# Patient Record
Sex: FEMALE | Race: WHITE | NOT HISPANIC OR LATINO | Employment: FULL TIME | URBAN - METROPOLITAN AREA
[De-identification: names, ages, dates, MRNs, and addresses within clinical notes are randomized per-mention and may not be internally consistent; named-entity substitution may affect disease eponyms.]

---

## 2017-01-12 ENCOUNTER — GENERIC CONVERSION - ENCOUNTER (OUTPATIENT)
Dept: OTHER | Facility: OTHER | Age: 32
End: 2017-01-12

## 2017-01-30 ENCOUNTER — ALLSCRIPTS OFFICE VISIT (OUTPATIENT)
Dept: OTHER | Facility: OTHER | Age: 32
End: 2017-01-30

## 2017-01-30 ENCOUNTER — APPOINTMENT (OUTPATIENT)
Dept: LAB | Facility: CLINIC | Age: 32
End: 2017-01-30
Payer: COMMERCIAL

## 2017-01-30 DIAGNOSIS — R73.9 HYPERGLYCEMIA: ICD-10-CM

## 2017-01-30 DIAGNOSIS — G47.00 INSOMNIA: ICD-10-CM

## 2017-01-30 LAB
ANION GAP SERPL CALCULATED.3IONS-SCNC: 6 MMOL/L (ref 4–13)
BUN SERPL-MCNC: 14 MG/DL (ref 5–25)
CALCIUM SERPL-MCNC: 8.9 MG/DL (ref 8.3–10.1)
CHLORIDE SERPL-SCNC: 105 MMOL/L (ref 100–108)
CHOLEST SERPL-MCNC: 186 MG/DL (ref 50–200)
CO2 SERPL-SCNC: 28 MMOL/L (ref 21–32)
CREAT SERPL-MCNC: 0.69 MG/DL (ref 0.6–1.3)
GFR SERPL CREATININE-BSD FRML MDRD: >60 ML/MIN/1.73SQ M
GLUCOSE SERPL-MCNC: 91 MG/DL (ref 65–140)
HDLC SERPL-MCNC: 37 MG/DL (ref 40–60)
LDLC SERPL CALC-MCNC: 88 MG/DL (ref 0–100)
POTASSIUM SERPL-SCNC: 4.4 MMOL/L (ref 3.5–5.3)
SODIUM SERPL-SCNC: 139 MMOL/L (ref 136–145)
TRIGL SERPL-MCNC: 303 MG/DL

## 2017-01-30 PROCEDURE — 80048 BASIC METABOLIC PNL TOTAL CA: CPT

## 2017-01-30 PROCEDURE — 36415 COLL VENOUS BLD VENIPUNCTURE: CPT

## 2017-01-30 PROCEDURE — 80061 LIPID PANEL: CPT

## 2017-08-29 ENCOUNTER — GENERIC CONVERSION - ENCOUNTER (OUTPATIENT)
Dept: OTHER | Facility: OTHER | Age: 32
End: 2017-08-29

## 2017-08-29 DIAGNOSIS — E78.1 PURE HYPERGLYCERIDEMIA: ICD-10-CM

## 2018-01-11 NOTE — MISCELLANEOUS
Message   Recorded as Task   Date: 12/27/2016 01:33 PM, Created By: [address]   Task Name: Follow Up   Assigned To: Rafael Stringer   Regarding Patient: Sergio Billings, Status: In Progress   Comment:    PraveenaJessy - 27 Dec 2016 1:33 PM     TASK CREATED  Can you please call this patient and let her know that her testing from her annual showed  BV - I sent an rx for metrogel for the patient  Her pap/HPV and cultures were negative  Thanks! Concepcion Peters - 27 Dec 2016 1:49 PM     TASK IN PROGRESS   Concepcion Peters - 27 Dec 2016 1:49 PM     TASK EDITED  lm for pt tcb for results and rx   Concepcion Peters - 27 Dec 2016 2:19 PM     TASK EDITED  pt informed results and rx        Active Problems    1  Bacterial vaginosis (616 10,041 9) (N76 0,B96 89)   2  Encounter for gynecological examination without abnormal finding (V72 31) (Z01 419)   3  Encounter for surveillance of contraceptive pills (V25 41) (Z30 41)   4  Insomnia (780 52) (G47 00)    Current Meds   1  MetroNIDAZOLE 0 75 % Vaginal Gel; APPLY 1 APPLICATOR Daily for 5 days; Therapy: 92Oxx8935 to (Last Rx:23Dwu4356)  Requested for: 54Bxw0381 Ordered   2  Tri-Estarylla 0 18/0 215/0 25 MG-35 MCG Oral Tablet; TAKE 1 TABLET DAILY AS   DIRECTED; Last Rx:[id number] Ordered   3  Zolpidem Tartrate 5 MG Oral Tablet; TAKE 1 TABLET AT  BEDTIME AS NEEDED FOR   INSOMNIA; Therapy: 13Yit6889 to (Evaluate:95Jeu5628); Last Rx:50Tmn0099 Ordered    Allergies    1   No Known Drug Allergies    Signatures   Electronically signed by : James Chakraborty, ; Dec 27 2016  2:19PM EST                       (Author)

## 2018-01-11 NOTE — MISCELLANEOUS
Message   Recorded as Task   Date: 01/12/2017 07:50 AM, Created By: Anni Diaz   Task Name: Call Back   Assigned To: Rosalinda Arce   Regarding Patient: Mady Hernandez, Status: In Progress   Comment:    Jessy Downs - 12 Jan 2017 7:50 AM     TASK CREATED  Caller: Self; Other; [phone] (Home)  pt was prescribed med for bacterial inf, done taking, got a Greek, done with that med,  pt now bleeding & using 2 super tampons a day, pt concerned pls CALL [phone],  ct7 is Ann Winston - 12 Jan 2017 8:17 AM     TASK IN PROGRESS   Jennifer Joaquin - 12 Jan 2017 8:25 AM     TASK EDITED  Pt used metrogel 12/27  Got yeast and on Jan 2, used Monistat 3  Began spotting and bleeding 1/7  Pt did take some ocs late  Pt still spotting - on 2nd week of pill  I told her to keep taking pills and if bleeding persists next cycle , tcb        Active Problems    1  Bacterial vaginosis (616 10,041 9) (N76 0,B96 89)   2  Encounter for gynecological examination without abnormal finding (V72 31) (Z01 419)   3  Encounter for surveillance of contraceptive pills (V25 41) (Z30 41)   4  Insomnia (780 52) (G47 00)    Current Meds   1  MetroNIDAZOLE 0 75 % Vaginal Gel; APPLY 1 APPLICATOR Daily for 5 days; Therapy: 78Obm4984 to (Last Rx:71Uyx1420)  Requested for: 16Qhn0688 Ordered   2  Tri-Estarylla 0 18/0 215/0 25 MG-35 MCG Oral Tablet; TAKE 1 TABLET DAILY AS   DIRECTED; Last Rx:[id number] Ordered   3  Zolpidem Tartrate 5 MG Oral Tablet; TAKE 1 TABLET AT  BEDTIME AS NEEDED FOR   INSOMNIA; Therapy: 46Wih9512 to (Evaluate:89Lhy1217); Last Rx:[id number] Ordered    Allergies    1  No Known Drug Allergies    Signatures   Electronically signed by :  Khoa Moreno, ; Jan 12 2017  8:26AM EST                       (Author)

## 2018-01-13 VITALS
TEMPERATURE: 96.7 F | WEIGHT: 154.25 LBS | SYSTOLIC BLOOD PRESSURE: 100 MMHG | HEART RATE: 68 BPM | DIASTOLIC BLOOD PRESSURE: 62 MMHG | BODY MASS INDEX: 22.85 KG/M2 | HEIGHT: 69 IN | RESPIRATION RATE: 14 BRPM

## 2018-01-22 VITALS
DIASTOLIC BLOOD PRESSURE: 70 MMHG | HEART RATE: 60 BPM | TEMPERATURE: 95.5 F | SYSTOLIC BLOOD PRESSURE: 112 MMHG | RESPIRATION RATE: 16 BRPM | WEIGHT: 157.25 LBS | BODY MASS INDEX: 23.56 KG/M2

## 2018-02-19 ENCOUNTER — TELEPHONE (OUTPATIENT)
Dept: FAMILY MEDICINE CLINIC | Facility: CLINIC | Age: 33
End: 2018-02-19

## 2018-02-20 NOTE — TELEPHONE ENCOUNTER
Patient was advised to follow up in 6 month  Last seen in 08/2017  Need to schedule appointment for follow up  Medication Zolpidem 10 mg once daily for insomnia can be filled until her next appointment

## 2018-03-19 ENCOUNTER — TELEPHONE (OUTPATIENT)
Dept: FAMILY MEDICINE CLINIC | Facility: CLINIC | Age: 33
End: 2018-03-19

## 2018-03-19 NOTE — TELEPHONE ENCOUNTER
Voicemail:    Patient requesting refill     Zolpidem 10mg    Please advise  Thank you      Patient has pending appointment with you 4/2018

## 2018-03-20 DIAGNOSIS — G47.00 INSOMNIA, UNSPECIFIED TYPE: Primary | ICD-10-CM

## 2018-03-20 RX ORDER — ZOLPIDEM TARTRATE 10 MG/1
1 TABLET ORAL
COMMUNITY
Start: 2016-12-21 | End: 2018-03-20 | Stop reason: SDUPTHER

## 2018-03-20 RX ORDER — ZOLPIDEM TARTRATE 10 MG/1
10 TABLET ORAL
Qty: 30 TABLET | Refills: 0 | OUTPATIENT
Start: 2018-03-20 | End: 2018-04-16 | Stop reason: SDUPTHER

## 2018-03-20 NOTE — TELEPHONE ENCOUNTER
Patient is scheduled to see you on 4/30/18, are you able to authorize refill?  Checked PDMP, last refill 2/19/18 #30

## 2018-03-20 NOTE — TELEPHONE ENCOUNTER
I have open schedule next week, advise her to schedule appointment next week if possible as she was last seen on 08/2017  She needs reevaluation before prescribing further medications and discussion about other options for insomnia Last refill was on 2/19/2018, checked PDMP  Medication will only be refilled for 7-10 days until her appointment

## 2018-04-16 DIAGNOSIS — G47.00 INSOMNIA, UNSPECIFIED TYPE: ICD-10-CM

## 2018-04-16 RX ORDER — ZOLPIDEM TARTRATE 10 MG/1
10 TABLET ORAL
Qty: 15 TABLET | Refills: 0 | Status: SHIPPED | OUTPATIENT
Start: 2018-04-16 | End: 2018-04-30 | Stop reason: SDUPTHER

## 2018-04-16 NOTE — TELEPHONE ENCOUNTER
Voice message requesting refill of Zolpidem 10mg   Checked PDMP, last refill 3/20/18, Patient was last seen 1/17/18

## 2018-04-30 ENCOUNTER — OFFICE VISIT (OUTPATIENT)
Dept: FAMILY MEDICINE CLINIC | Facility: CLINIC | Age: 33
End: 2018-04-30
Payer: COMMERCIAL

## 2018-04-30 VITALS
RESPIRATION RATE: 16 BRPM | HEIGHT: 68 IN | WEIGHT: 146.4 LBS | SYSTOLIC BLOOD PRESSURE: 100 MMHG | BODY MASS INDEX: 22.19 KG/M2 | TEMPERATURE: 96.9 F | HEART RATE: 70 BPM | DIASTOLIC BLOOD PRESSURE: 60 MMHG

## 2018-04-30 DIAGNOSIS — J30.9 ALLERGIC RHINITIS, UNSPECIFIED SEASONALITY, UNSPECIFIED TRIGGER: ICD-10-CM

## 2018-04-30 DIAGNOSIS — G47.00 INSOMNIA, UNSPECIFIED TYPE: ICD-10-CM

## 2018-04-30 DIAGNOSIS — F51.01 PRIMARY INSOMNIA: ICD-10-CM

## 2018-04-30 DIAGNOSIS — E78.1 HYPERTRIGLYCERIDEMIA: Primary | ICD-10-CM

## 2018-04-30 PROBLEM — R21 SKIN RASH: Status: ACTIVE | Noted: 2017-08-29

## 2018-04-30 PROCEDURE — 99214 OFFICE O/P EST MOD 30 MIN: CPT | Performed by: FAMILY MEDICINE

## 2018-04-30 PROCEDURE — 1036F TOBACCO NON-USER: CPT | Performed by: FAMILY MEDICINE

## 2018-04-30 RX ORDER — FLUTICASONE PROPIONATE 50 MCG
1 SPRAY, SUSPENSION (ML) NASAL DAILY
Qty: 16 G | Refills: 0 | Status: SHIPPED | OUTPATIENT
Start: 2018-04-30 | End: 2019-07-22

## 2018-04-30 RX ORDER — NORGESTIMATE AND ETHINYL ESTRADIOL 7DAYSX3 28
1 KIT ORAL DAILY
COMMUNITY
End: 2019-07-22

## 2018-04-30 RX ORDER — ZOLPIDEM TARTRATE 10 MG/1
10 TABLET ORAL
Qty: 30 TABLET | Refills: 2 | Status: SHIPPED | OUTPATIENT
Start: 2018-04-30 | End: 2018-07-31 | Stop reason: SDUPTHER

## 2018-04-30 NOTE — ASSESSMENT & PLAN NOTE
Discussed with patient that long term zolpidem use is less than ideal, and that treating her underlying anxiety would help reduce need for zolpidem  Patient acknowledged this, and stated that she'd pay more attention to doing mindfulness exercising for her anxiety  Declined therapy or anxiolytics at this time  I recommended sleep medicine referral after her wedding in October, to assess alternatives to sleep aids

## 2018-04-30 NOTE — PROGRESS NOTES
Assessment/Plan     Allergic rhinitis  Start fluticasone spray, may use OTC H1 blockers  Hypertriglyceridemia  Recheck lipids, CMP now  Insomnia  Discussed with patient that long term zolpidem use is less than ideal, and that treating her underlying anxiety would help reduce need for zolpidem  Patient acknowledged this, and stated that she'd pay more attention to doing mindfulness exercising for her anxiety  Declined therapy or anxiolytics at this time  I recommended sleep medicine referral after her wedding in October, to assess alternatives to sleep aids  RTC 6 mo    Subjective     Chief Complaint: f/u insomnia    HPI:   HPI     Patient with chronic insomnia presents in followup  At last visit, was given dietary intervention ideas for hypertriglyceridemia  Has still been having insomnia, and has quite a bit more life stress lately (active job, planning a wedding, closed on a house)  Admits that her stress can affect her relationships at times  No other new complaints  The following portions of the patient's history were reviewed and updated as appropriate: allergies, current medications, past family history, past medical history, past social history, past surgical history and problem list     Review of Systems  Review of Systems   Constitutional: Negative for activity change, chills, fatigue and fever  HENT: Negative for congestion, sinus pain, sinus pressure and sore throat  Respiratory: Negative for cough, shortness of breath and wheezing  Cardiovascular: Negative for chest pain, palpitations and leg swelling  Gastrointestinal: Negative for abdominal pain, diarrhea, nausea and vomiting  Genitourinary: Negative for dysuria, frequency and urgency  Musculoskeletal: Negative for arthralgias, myalgias, neck pain and neck stiffness  Skin: Negative for rash  Neurological: Negative for light-headedness and headaches  Psychiatric/Behavioral: Positive for sleep disturbance   The patient is nervous/anxious  Objective   Vitals:    04/30/18 0942   BP: 100/60   Pulse: 70   Resp: 16   Temp: (!) 96 9 °F (36 1 °C)       Physical Exam   Constitutional: She is oriented to person, place, and time  She appears well-developed and well-nourished  No distress  HENT:   Head: Normocephalic and atraumatic  Eyes: Conjunctivae and EOM are normal  Pupils are equal, round, and reactive to light  Neck: Normal range of motion  Neck supple  Cardiovascular: Normal rate, regular rhythm and normal heart sounds  No murmur heard  Pulmonary/Chest: Effort normal and breath sounds normal  No respiratory distress  Abdominal: Soft  Bowel sounds are normal  There is no tenderness  Musculoskeletal: Normal range of motion  Neurological: She is alert and oriented to person, place, and time  Skin: Skin is warm and dry  Psychiatric: She has a normal mood and affect  Her behavior is normal  Judgment and thought content normal        Lab Results: I have reviewed all the lab results

## 2018-06-14 ENCOUNTER — HOSPITAL ENCOUNTER (OUTPATIENT)
Dept: ULTRASOUND IMAGING | Facility: HOSPITAL | Age: 33
Discharge: HOME | End: 2018-06-14
Payer: COMMERCIAL

## 2018-06-14 ENCOUNTER — OFFICE VISIT (OUTPATIENT)
Dept: FAMILY MEDICINE CLINIC | Facility: CLINIC | Age: 33
End: 2018-06-14
Payer: COMMERCIAL

## 2018-06-14 ENCOUNTER — TRANSCRIBE ORDERS (OUTPATIENT)
Dept: LAB | Facility: CLINIC | Age: 33
End: 2018-06-14

## 2018-06-14 ENCOUNTER — APPOINTMENT (OUTPATIENT)
Dept: LAB | Facility: CLINIC | Age: 33
End: 2018-06-14
Payer: COMMERCIAL

## 2018-06-14 VITALS
HEART RATE: 84 BPM | HEIGHT: 68 IN | RESPIRATION RATE: 16 BRPM | DIASTOLIC BLOOD PRESSURE: 80 MMHG | SYSTOLIC BLOOD PRESSURE: 124 MMHG | TEMPERATURE: 97.6 F | BODY MASS INDEX: 22.28 KG/M2 | WEIGHT: 147 LBS

## 2018-06-14 DIAGNOSIS — R10.13 MIDEPIGASTRIC PAIN: ICD-10-CM

## 2018-06-14 DIAGNOSIS — R10.13 MIDEPIGASTRIC PAIN: Primary | ICD-10-CM

## 2018-06-14 LAB
ALBUMIN SERPL BCP-MCNC: 3.5 G/DL (ref 3.5–5)
ALP SERPL-CCNC: 62 U/L (ref 46–116)
ALT SERPL W P-5'-P-CCNC: 24 U/L (ref 12–78)
ANION GAP SERPL CALCULATED.3IONS-SCNC: 6 MMOL/L (ref 4–13)
AST SERPL W P-5'-P-CCNC: 22 U/L (ref 5–45)
BILIRUB SERPL-MCNC: 0.3 MG/DL (ref 0.2–1)
BUN SERPL-MCNC: 14 MG/DL (ref 5–25)
CALCIUM SERPL-MCNC: 9 MG/DL (ref 8.3–10.1)
CHLORIDE SERPL-SCNC: 104 MMOL/L (ref 100–108)
CO2 SERPL-SCNC: 30 MMOL/L (ref 21–32)
CREAT SERPL-MCNC: 0.76 MG/DL (ref 0.6–1.3)
GFR SERPL CREATININE-BSD FRML MDRD: 103 ML/MIN/1.73SQ M
GLUCOSE SERPL-MCNC: 82 MG/DL (ref 65–140)
POTASSIUM SERPL-SCNC: 4.1 MMOL/L (ref 3.5–5.3)
PROT SERPL-MCNC: 6.6 G/DL (ref 6.4–8.2)
SODIUM SERPL-SCNC: 140 MMOL/L (ref 136–145)

## 2018-06-14 PROCEDURE — 36415 COLL VENOUS BLD VENIPUNCTURE: CPT

## 2018-06-14 PROCEDURE — 80053 COMPREHEN METABOLIC PANEL: CPT

## 2018-06-14 PROCEDURE — 3008F BODY MASS INDEX DOCD: CPT | Performed by: FAMILY MEDICINE

## 2018-06-14 PROCEDURE — 76705 ECHO EXAM OF ABDOMEN: CPT

## 2018-06-14 PROCEDURE — 99213 OFFICE O/P EST LOW 20 MIN: CPT | Performed by: FAMILY MEDICINE

## 2018-06-14 RX ORDER — PANTOPRAZOLE SODIUM 40 MG/1
40 TABLET, DELAYED RELEASE ORAL DAILY
Qty: 30 TABLET | Refills: 1 | Status: SHIPPED | OUTPATIENT
Start: 2018-06-14 | End: 2019-07-22

## 2018-06-14 NOTE — Clinical Note
Dr Allyn Fermin,   It looks like the technicians changed the order from a RUQ to an [address] abdomen limited when they performed it  Just wanted to update you!   Thanks,  [employer]

## 2018-06-14 NOTE — PROGRESS NOTES
Pierre Mejia 1985 female MRN: [id number]    Family Medicine Acute Visit    ASSESSMENT/PLAN  Problem List Items Addressed This Visit     Midepigastric pain - Primary     CMP, RUQ U/S - consider possible gallstones vs cholecystitis vs GERD/gastritis/PUD  Does not seem related to dietary intake, so PUD lower on differential  Will trial on high dose PPI as well - protonix 40mg daily  Urine HCG negative in office  F/U in office in next 2wk to review results and effect of PPI         Relevant Medications    pantoprazole (PROTONIX) 40 mg tablet    Other Relevant Orders    Comprehensive metabolic panel (Completed)              Future Appointments  Date Time Provider Alyx Lazaro   6/29/2018 2:00 PM José Suarez MD [facility]          SUBJECTIVE  CC: Abdominal Pain      HPI:  Pierre Mejia is a 35 y o  female who presents for abdominal pain    1 week history of mid epigastric abdominal pain, worse in past 3d - sharp and aching, feels better if she leans over, can't have bra strapped tight because it puts too much pressure on abdomen  Never had pain like this  Comes on suddenly and then stays rest of day  Has tried nexium for the past 3d, heating pad, meenakshi seltzer tablet - none of which has helped  In general she maintains a very clean diet and is a pescatarian   Traveling in [place] 1 week ago - but no new foods or changes   No n/v/d, +bloating, +"constipation" per Pt - last BM yesterday morning, normally 2 per day  No straining or hard stools  Tried dulcolax suppository, which brought about additional stools, but did not relieve pain    LMP due in 1 week, regular menstrual cycle, on birth control, takes it reliably         Review of Systems   Constitutional: Negative for chills, fatigue and fever  HENT: Negative for congestion, ear pain and sore throat  Respiratory: Negative for cough and shortness of breath  Cardiovascular: Negative for chest pain and palpitations  Gastrointestinal:        As per HPI   Genitourinary: Negative for dysuria, frequency, menstrual problem, pelvic pain, urgency, vaginal discharge and vaginal pain  Skin: Negative for rash  Historical Information   The patient history was reviewed as follows:    No past medical history on file  No past surgical history on file  Family History   Problem Relation Age of Onset    Hypertension Mother     Hypertension Father     Heart disease Maternal Grandfather         CARDIAC DISORDER    Coronary artery disease Family         NOT SPECIFIED WHICH GP      Social History   History   Alcohol use Not on file     History   Drug use: Unknown     History   Smoking Status    Never Smoker   Smokeless Tobacco    Never Used       Medications:     Current Outpatient Prescriptions:     norgestimate-ethinyl estradiol (TRI-ESTARYLLA) 0 18/0 215/0 25 MG-35 MCG per tablet, Take 1 tablet by mouth daily, Disp: , Rfl:     zolpidem (AMBIEN) 10 mg tablet, Take 1 tablet (10 mg total) by mouth daily at bedtime as needed for sleep, Disp: 30 tablet, Rfl: 2    fluticasone (FLONASE) 50 mcg/act nasal spray, 1 spray into each nostril daily, Disp: 16 g, Rfl: 0    pantoprazole (PROTONIX) 40 mg tablet, Take 1 tablet (40 mg total) by mouth daily, Disp: 30 tablet, Rfl: 1  No Known Allergies    OBJECTIVE    Vitals:   Vitals:    06/14/18 1400   BP: 124/80   Pulse: 84   Resp: 16   Temp: 97 6 °F (36 4 °C)   Weight: 66 7 kg (147 lb)   Height: 5' 8" (1 727 m)     Wt Readings from Last 3 Encounters:   06/14/18 66 7 kg (147 lb)   04/30/18 66 4 kg (146 lb 6 4 oz)   08/29/17 71 3 kg (157 lb 4 oz)     Body mass index is 22 35 kg/m²  Physical Exam   Constitutional: She is oriented to person, place, and time  She appears well-developed and well-nourished  No distress  HENT:   Head: Normocephalic and atraumatic  Eyes: Conjunctivae and EOM are normal  Right eye exhibits no discharge  Left eye exhibits no discharge  No scleral icterus  Neck: Normal range of motion  Neck supple  No tracheal deviation present  Cardiovascular: Normal rate, regular rhythm, normal heart sounds and intact distal pulses  Exam reveals no gallop and no friction rub  No murmur heard  Pulmonary/Chest: Effort normal and breath sounds normal  No respiratory distress  She has no wheezes  She has no rales  Abdominal: Soft  Bowel sounds are normal  She exhibits no distension  There is tenderness (+mid epigastric tenderness, +RUQ tenderness, but negative galicia sign, +LUQ tenderness)  There is no rebound, no guarding, no CVA tenderness and no tenderness at McBurney's point  Musculoskeletal: She exhibits no edema or deformity  Lymphadenopathy:     She has no cervical adenopathy  Neurological: She is alert and oriented to person, place, and time  She exhibits normal muscle tone  Skin: Skin is warm and dry  No rash noted  She is not diaphoretic  No erythema  Psychiatric: She has a normal mood and affect  Her behavior is normal  Judgment and thought content normal    Vitals reviewed  Lab:  I have personally reviewed all pertinent results  Imaging:  I have personally reviewed all pertinent results        Moses Sofia DO, PGY-3  [facility]   6/18/2018

## 2018-06-18 ENCOUNTER — TELEPHONE (OUTPATIENT)
Dept: FAMILY MEDICINE CLINIC | Facility: CLINIC | Age: 33
End: 2018-06-18

## 2018-06-18 PROBLEM — R10.13 MIDEPIGASTRIC PAIN: Status: ACTIVE | Noted: 2018-06-18

## 2018-06-18 NOTE — ASSESSMENT & PLAN NOTE
CMP, RUQ U/S - consider possible gallstones vs cholecystitis vs GERD/gastritis/PUD  Does not seem related to dietary intake, so PUD lower on differential  Will trial on high dose PPI as well - protonix 40mg daily  Urine HCG negative in office  F/U in office in next 2wk to review results and effect of PPI

## 2018-06-20 NOTE — TELEPHONE ENCOUNTER
Just tried to call her to discuss, left her a message to call back and let you know when is a good time for her to talk  I'd like to review it with her if possible  I'll try her again tomorrow while I'm in the office

## 2018-06-21 ENCOUNTER — TELEPHONE (OUTPATIENT)
Dept: FAMILY MEDICINE CLINIC | Facility: CLINIC | Age: 33
End: 2018-06-21

## 2018-06-22 NOTE — TELEPHONE ENCOUNTER
Tried calling twice yesterday, unable to get through  Was busy sign  Please review her ultrasound report with her and normal CMP  The liver mass I don't think is contributing to her abdominal pain, and it is an incidental finding that is likely benign  These are not uncommon  We should repeat the ultrasound in 6 months to make sure it hasn't grown in size    Please have her return to office at her prearranged follow up date next week to discuss her abdominal pain further

## 2018-06-22 NOTE — TELEPHONE ENCOUNTER
Patient returning Dr Floyd Diamond missed call  If doctor is not available patient would not mind speaking with a nurse

## 2018-07-31 DIAGNOSIS — G47.00 INSOMNIA, UNSPECIFIED TYPE: ICD-10-CM

## 2018-07-31 DIAGNOSIS — F51.01 PRIMARY INSOMNIA: ICD-10-CM

## 2018-07-31 RX ORDER — ZOLPIDEM TARTRATE 10 MG/1
10 TABLET ORAL
Qty: 30 TABLET | Refills: 0 | Status: SHIPPED | OUTPATIENT
Start: 2018-07-31 | End: 2018-09-05 | Stop reason: SDUPTHER

## 2018-09-04 ENCOUNTER — TELEPHONE (OUTPATIENT)
Dept: FAMILY MEDICINE CLINIC | Facility: CLINIC | Age: 33
End: 2018-09-04

## 2018-09-04 DIAGNOSIS — G47.00 INSOMNIA, UNSPECIFIED TYPE: ICD-10-CM

## 2018-09-04 DIAGNOSIS — F51.01 PRIMARY INSOMNIA: ICD-10-CM

## 2018-09-04 NOTE — TELEPHONE ENCOUNTER
RECEIEVED CALL FROM PTS BOYFRIEND STATING PT OUT OF MEDICATION WOULD LIKE REFILL CALLED IN AS SOON AS POSSIBLE

## 2018-09-05 RX ORDER — ZOLPIDEM TARTRATE 10 MG/1
10 TABLET ORAL
Qty: 30 TABLET | Refills: 0 | Status: SHIPPED | OUTPATIENT
Start: 2018-09-05 | End: 2018-10-02 | Stop reason: SDUPTHER

## 2018-09-05 NOTE — TELEPHONE ENCOUNTER
Refill request sent to Dr Romana Lawrence to [facility]  Also could could we please call the patient to schedule an appointment sometime at the end of the month   Thank you

## 2018-09-05 NOTE — TELEPHONE ENCOUNTER
Voicemail from pt requesting refill of Zolpidiem 10 mg  To go to [facility]  [employer]  Pt was a pt of Dr Ibrahima dempsey and last refill was 8/3/18 and we were the provider of the med

## 2018-10-01 DIAGNOSIS — G47.00 INSOMNIA, UNSPECIFIED TYPE: ICD-10-CM

## 2018-10-01 DIAGNOSIS — F51.01 PRIMARY INSOMNIA: ICD-10-CM

## 2018-10-01 NOTE — TELEPHONE ENCOUNTER
Pt calling for a refill of zolpidiem 10 mg tabs   PDMP states last refill was on 9/5/18 30 tabs  She is a past pt of Dr Georgiann Burkitt her last office visit was April 2018

## 2018-10-02 NOTE — TELEPHONE ENCOUNTER
Lorena Ashley,  Could you please E-prescribe Zolpidem for this patient? The patient called for a refill  Per PDMP, last Rx written 9/5/18 and filled for 30 days Disp # 30 tablets     Rx is for Zolpidem 10 mg once qhs, Disp # 30, 0 refills  Please let me or clinical staff know when the Rx has been sent so we may notify the patient  Thank you so much!

## 2018-10-03 RX ORDER — ZOLPIDEM TARTRATE 10 MG/1
10 TABLET ORAL
Qty: 30 TABLET | Refills: 0 | Status: SHIPPED | OUTPATIENT
Start: 2018-10-03 | End: 2018-10-23 | Stop reason: SDUPTHER

## 2018-10-22 DIAGNOSIS — F51.01 PRIMARY INSOMNIA: ICD-10-CM

## 2018-10-22 NOTE — TELEPHONE ENCOUNTER
Pt calling for refill of her Zolpidiem 10 mg is leaving on Friday for a trip for her wedding  Pt states today may be early but wont be back until after due date  Last refill in chart states 10/3  Thank you

## 2018-10-23 DIAGNOSIS — F51.01 PRIMARY INSOMNIA: ICD-10-CM

## 2018-10-23 RX ORDER — ZOLPIDEM TARTRATE 10 MG/1
10 TABLET ORAL
Qty: 30 TABLET | Refills: 0 | Status: SHIPPED | OUTPATIENT
Start: 2019-10-03 | End: 2018-10-23 | Stop reason: SDUPTHER

## 2018-10-23 RX ORDER — ZOLPIDEM TARTRATE 10 MG/1
10 TABLET ORAL
Qty: 30 TABLET | Refills: 0 | Status: SHIPPED | OUTPATIENT
Start: 2019-10-03 | End: 2018-11-28 | Stop reason: SDUPTHER

## 2018-11-27 DIAGNOSIS — F51.01 PRIMARY INSOMNIA: ICD-10-CM

## 2018-11-28 RX ORDER — ZOLPIDEM TARTRATE 10 MG/1
10 TABLET ORAL
Qty: 30 TABLET | Refills: 0 | Status: SHIPPED | OUTPATIENT
Start: 2019-10-03 | End: 2018-12-16 | Stop reason: SDUPTHER

## 2018-11-28 NOTE — TELEPHONE ENCOUNTER
Please call patient to schedule an appointment after this refill  Will not longer provide refills unless seen in the office   Thank you

## 2018-11-28 NOTE — TELEPHONE ENCOUNTER
Boogie Dorado! Could you please E-prescribe Zolpidem for this patient? The patient called for a refill  Rx is for Zolpidem 10 mg, Disp # 30, 0 refills  Per PDMP, last Rx written 10/28 & filled 10/28, Disp # 30    Please let me or clinical staff know when the Rx has been sent so we may notify the patient  Thank you so much!

## 2018-12-13 DIAGNOSIS — F51.01 PRIMARY INSOMNIA: ICD-10-CM

## 2018-12-17 RX ORDER — ZOLPIDEM TARTRATE 10 MG/1
10 TABLET ORAL
Qty: 30 TABLET | Refills: 0 | Status: SHIPPED | OUTPATIENT
Start: 2019-10-03 | End: 2019-01-23 | Stop reason: SDUPTHER

## 2018-12-26 NOTE — TELEPHONE ENCOUNTER
PT left voice mail to have zolpidem refilled, I called PT back to let her know it was refilled, she said she did not get a notification from the pharmacy and I explained it was refilled on 12/17/2018 PT stated she would call pharmacy and call back with any issues

## 2018-12-26 NOTE — TELEPHONE ENCOUNTER
Patient called and is requesting a medication refill for Zolpidem patient was told in November to set up an appointment to see her PCP  Patient has a pending appointment for 02/14 with Dr Weston that was made 12/13 patient is not willing to see Moe Art would like to stay with Dr Weston  Are we able to refill till than? Thank you in advance

## 2019-01-22 DIAGNOSIS — F51.01 PRIMARY INSOMNIA: ICD-10-CM

## 2019-01-23 RX ORDER — ZOLPIDEM TARTRATE 10 MG/1
10 TABLET ORAL
Qty: 30 TABLET | Refills: 0 | Status: SHIPPED | OUTPATIENT
Start: 2019-01-25 | End: 2019-02-18 | Stop reason: SDUPTHER

## 2019-01-23 NOTE — TELEPHONE ENCOUNTER
Lorena Ruby! Can you please E-prescribe Zolpidem 10 mg daily for this patient? The patient called for a refill  They have an appointment scheduled with Dr Jitendra Loomis on 2/14/19  Rx is for Zolpidem 10 mg qhs, Disp #30 , 0 refills  Per PDMP, last Rx written 11/25/18 & filled 12/28/18 , Disp # 30    Please let me or clinical staff know when the Rx has been sent so we may notify the patient  Thank you so much!

## 2019-02-14 ENCOUNTER — TELEPHONE (OUTPATIENT)
Dept: FAMILY MEDICINE CLINIC | Facility: CLINIC | Age: 34
End: 2019-02-14

## 2019-02-14 NOTE — TELEPHONE ENCOUNTER
Patient called to check on appt time, she missed her appt today for medication review  She said she didn't get reminder call  I put her in your [facility] for 2/18/19  We have been told by other patients that they haven't gotten reminders   Raulito Smith is looking into it

## 2019-02-18 ENCOUNTER — OFFICE VISIT (OUTPATIENT)
Dept: FAMILY MEDICINE CLINIC | Facility: CLINIC | Age: 34
End: 2019-02-18

## 2019-02-18 VITALS
WEIGHT: 152.6 LBS | RESPIRATION RATE: 16 BRPM | BODY MASS INDEX: 23.13 KG/M2 | SYSTOLIC BLOOD PRESSURE: 114 MMHG | HEIGHT: 68 IN | TEMPERATURE: 96.6 F | DIASTOLIC BLOOD PRESSURE: 70 MMHG | HEART RATE: 80 BPM

## 2019-02-18 DIAGNOSIS — E78.1 HYPERTRIGLYCERIDEMIA: ICD-10-CM

## 2019-02-18 DIAGNOSIS — Z31.69 PRE-CONCEPTION COUNSELING: ICD-10-CM

## 2019-02-18 DIAGNOSIS — F51.01 PRIMARY INSOMNIA: Primary | ICD-10-CM

## 2019-02-18 PROCEDURE — 99214 OFFICE O/P EST MOD 30 MIN: CPT | Performed by: FAMILY MEDICINE

## 2019-02-18 PROCEDURE — 3008F BODY MASS INDEX DOCD: CPT | Performed by: FAMILY MEDICINE

## 2019-02-18 RX ORDER — ZOLPIDEM TARTRATE 10 MG/1
10 TABLET ORAL
Qty: 30 TABLET | Refills: 2 | Status: SHIPPED | OUTPATIENT
Start: 2019-02-18 | End: 2019-03-15 | Stop reason: SDUPTHER

## 2019-02-18 NOTE — ASSESSMENT & PLAN NOTE
Refilled ambien  Referral given to sleep medicine to consider D/C as patient plans to conceive this year    Check CBC, TSH

## 2019-02-18 NOTE — PROGRESS NOTES
Family Medicine Follow-Up Office Visit  Dean Riedel 29 y o  female   MRN: [id number] : 1985  ENCOUNTER: 2019 11:04 AM    Assessment and Plan   Insomnia  Refilled Jobie Setting  Referral given to sleep medicine to consider D/C as patient plans to conceive this year  Check CBC, TSH  Hypertriglyceridemia  Check CMP, Lipids  RTC 6 months, counseling given regarding starting prenatal vitamins before trying to conceive  Chief Complaint     Chief Complaint   Patient presents with    Medication Refill       History of Present Illness   Dean Riedel is a 29y o -year-old female who presents today for followup of insomnia  Pt reports that she's doing well, using ambien but looking to taper off now that she and her  are considering conceiving  Pt also requests referral to fertility specialist to discuss best timing for conception  Review of Systems   Review of Systems   Constitutional: Negative for activity change, chills, fatigue and fever  HENT: Negative for congestion, sinus pressure, sinus pain and sore throat  Respiratory: Negative for cough, shortness of breath and wheezing  Cardiovascular: Negative for chest pain, palpitations and leg swelling  Gastrointestinal: Negative for abdominal pain, diarrhea, nausea and vomiting  Genitourinary: Negative for decreased urine volume, dysuria, frequency and urgency  Musculoskeletal: Negative for arthralgias, myalgias, neck pain and neck stiffness  Skin: Negative for rash  Neurological: Negative for dizziness, light-headedness, numbness and headaches  Active Problem List     Patient Active Problem List   Diagnosis    Hypertriglyceridemia    Insomnia    Skin rash    Allergic rhinitis    Midepigastric pain       Past Medical History, Past Surgical History, Family History, and Social History were reviewed and updated today as appropriate      Objective   /70   Pulse 80   Temp (!) 96 6 °F (35 9 °C)   Resp 16    5' 8" (1 727 m)   Wt 69 2 kg (152 lb 9 6 oz)   BMI 23 20 kg/m²     Physical Exam   Constitutional: She is oriented to person, place, and time  She appears well-developed and well-nourished  No distress  HENT:   Head: Normocephalic and atraumatic  Eyes: Pupils are equal, round, and reactive to light  Neck: Normal range of motion  Neck supple  Cardiovascular: Normal rate, regular rhythm and normal heart sounds  Exam reveals no gallop and no friction rub  No murmur heard  Pulmonary/Chest: Effort normal and breath sounds normal  No respiratory distress  She has no wheezes  She has no rales  Abdominal: Soft  She exhibits no distension  Musculoskeletal: Normal range of motion  Neurological: She is alert and oriented to person, place, and time  Psychiatric: She has a normal mood and affect   Her behavior is normal  Thought content normal      Diabetic Foot Exam    Pertinent Laboratory/Diagnostic Studies:  Lab Results   Component Value Date    BUN 14 06/14/2018    CREATININE 0 76 06/14/2018    CALCIUM 9 0 06/14/2018    K 4 1 06/14/2018    CO2 30 06/14/2018     06/14/2018     Lab Results   Component Value Date    ALT 24 06/14/2018    AST 22 06/14/2018    ALKPHOS 62 06/14/2018       No results found for: WBC, HGB, HCT, MCV, PLT    No results found for: TSH    No results found for: CHOL  Lab Results   Component Value Date    TRIG 303 (H) 01/30/2017     Lab Results   Component Value Date    HDL 37 (L) 01/30/2017     Lab Results   Component Value Date    LDLCALC 88 01/30/2017     No results found for: HGBA1C    Results for orders placed or performed in visit on 06/14/18   Comprehensive metabolic panel   Result Value Ref Range    Sodium 140 136 - 145 mmol/L    Potassium 4 1 3 5 - 5 3 mmol/L    Chloride 104 100 - 108 mmol/L    CO2 30 21 - 32 mmol/L    ANION GAP 6 4 - 13 mmol/L    BUN 14 5 - 25 mg/dL    Creatinine 0 76 0 60 - 1 30 mg/dL    Glucose 82 65 - 140 mg/dL    Calcium 9 0 8 3 - 10 1 mg/dL AST 22 5 - 45 U/L    ALT 24 12 - 78 U/L    Alkaline Phosphatase 62 46 - 116 U/L    Total Protein 6 6 6 4 - 8 2 g/dL    Albumin 3 5 3 5 - 5 0 g/dL    Total Bilirubin 0 30 0 20 - 1 00 mg/dL    eGFR 103 ml/min/1 73sq m       Orders Placed This Encounter   Procedures    Lipid Panel with Direct LDL reflex    Comprehensive metabolic panel    TSH, 3rd generation with Free T4 reflex    CBC and differential    Ambulatory referral to Sleep Medicine         Current Medications     Current Outpatient Medications   Medication Sig Dispense Refill    zolpidem (AMBIEN) 10 mg tablet Take 1 tablet (10 mg total) by mouth daily at bedtime as needed for sleep 30 tablet 0    fluticasone (FLONASE) 50 mcg/act nasal spray 1 spray into each nostril daily (Patient not taking: Reported on 2/18/2019) 16 g 0    norgestimate-ethinyl estradiol (TRI-ESTARYLLA) 0 18/0 215/0 25 MG-35 MCG per tablet Take 1 tablet by mouth daily      pantoprazole (PROTONIX) 40 mg tablet Take 1 tablet (40 mg total) by mouth daily (Patient not taking: Reported on 2/18/2019) 30 tablet 1     No current facility-administered medications for this visit  ALLERGIES:  No Known Allergies    Health Maintenance     Health Maintenance   Topic Date Due    DTaP,Tdap,and Td Vaccines (1 - Tdap) 01/28/2006    INFLUENZA VACCINE  07/01/2018    Depression Screening PHQ  04/30/2019    BMI: Adult  06/14/2019    PAP SMEAR  12/21/2019    HEPATITIS B VACCINES  Aged Out     Immunization History   Administered Date(s) Administered    Influenza Quadrivalent, 6-35 Months IM 01/30/2017         Simon Douglass MD   [address]  2/18/2019  11:04 AM    Parts of this note were dictated using [employer] dictation software and may have sounds-like errors due to variation in pronunciation

## 2019-03-15 ENCOUNTER — TELEPHONE (OUTPATIENT)
Dept: FAMILY MEDICINE CLINIC | Facility: CLINIC | Age: 34
End: 2019-03-15

## 2019-03-15 DIAGNOSIS — F51.01 PRIMARY INSOMNIA: ICD-10-CM

## 2019-03-15 RX ORDER — ZOLPIDEM TARTRATE 10 MG/1
10 TABLET ORAL
Qty: 30 TABLET | Refills: 2 | Status: SHIPPED | OUTPATIENT
Start: 2019-03-15 | End: 2019-04-15 | Stop reason: SDUPTHER

## 2019-03-15 NOTE — TELEPHONE ENCOUNTER
Voice message requesting refill of Zolpidem 10mg, last seen 2/18/19   Checked PDMP, last refill 2/19/19

## 2019-04-12 DIAGNOSIS — F51.01 PRIMARY INSOMNIA: Primary | ICD-10-CM

## 2019-04-15 RX ORDER — ZOLPIDEM TARTRATE 10 MG/1
10 TABLET ORAL
Qty: 30 TABLET | Refills: 0 | Status: SHIPPED | OUTPATIENT
Start: 2019-04-15 | End: 2019-06-11 | Stop reason: SDUPTHER

## 2019-06-11 ENCOUNTER — TELEPHONE (OUTPATIENT)
Dept: FAMILY MEDICINE CLINIC | Facility: CLINIC | Age: 34
End: 2019-06-11

## 2019-06-11 DIAGNOSIS — F51.01 PRIMARY INSOMNIA: ICD-10-CM

## 2019-06-11 RX ORDER — ZOLPIDEM TARTRATE 10 MG/1
10 TABLET ORAL
Qty: 30 TABLET | Refills: 0 | Status: SHIPPED | OUTPATIENT
Start: 2019-06-11 | End: 2019-07-22

## 2019-06-25 ENCOUNTER — TELEPHONE (OUTPATIENT)
Dept: FAMILY MEDICINE CLINIC | Facility: CLINIC | Age: 34
End: 2019-06-25

## 2019-06-27 ENCOUNTER — TELEPHONE (OUTPATIENT)
Dept: OBGYN CLINIC | Facility: CLINIC | Age: 34
End: 2019-06-27

## 2019-07-17 PROBLEM — Z34.91 VIABLE PREGNANCY IN FIRST TRIMESTER: Status: ACTIVE | Noted: 2019-07-17

## 2019-07-22 ENCOUNTER — INITIAL PRENATAL (OUTPATIENT)
Dept: OBGYN CLINIC | Facility: CLINIC | Age: 34
End: 2019-07-22

## 2019-07-22 VITALS
SYSTOLIC BLOOD PRESSURE: 110 MMHG | HEIGHT: 68 IN | BODY MASS INDEX: 23.64 KG/M2 | DIASTOLIC BLOOD PRESSURE: 68 MMHG | WEIGHT: 156 LBS

## 2019-07-22 DIAGNOSIS — Z34.91 VIABLE PREGNANCY IN FIRST TRIMESTER: Primary | ICD-10-CM

## 2019-07-22 PROCEDURE — NOBC: Performed by: PHYSICIAN ASSISTANT

## 2019-07-22 RX ORDER — MULTIVIT-MIN/IRON/FOLIC/HRB186 3.3 MG-25
TABLET ORAL
COMMUNITY
End: 2020-04-24 | Stop reason: ALTCHOICE

## 2019-07-22 NOTE — PROGRESS NOTES
Pt for NOB  Feels ok  Increased fatigue  No vb/lof  Cx's and PE done, pap up to date  Pt does plan to breast feed  No h/o MRSA, had Varicella as a child  No cats in the home, no out of country travel planned through the pregnancy  Slip written for initial OB panel  Pt does plan genetic testing, will likely opt for cell free fetal DNA testing as she will be 35 at time of delivery

## 2019-07-25 LAB
DEPRECATED C TRACH RRNA XXX QL PRB: NOT DETECTED
N GONORRHOEA DNA UR QL NAA+PROBE: NOT DETECTED
SL AMB GENITAL CULTURE: NORMAL
T VAGINALIS RRNA SPEC QL NAA+PROBE: NOT DETECTED

## 2019-08-02 ENCOUNTER — LAB REQUISITION (OUTPATIENT)
Dept: LAB | Facility: HOSPITAL | Age: 34
End: 2019-08-02
Payer: COMMERCIAL

## 2019-08-02 ENCOUNTER — APPOINTMENT (OUTPATIENT)
Dept: LAB | Facility: AMBULARY SURGERY CENTER | Age: 34
End: 2019-08-02
Payer: COMMERCIAL

## 2019-08-02 DIAGNOSIS — Z34.91 VIABLE PREGNANCY IN FIRST TRIMESTER: ICD-10-CM

## 2019-08-02 DIAGNOSIS — F51.01 PRIMARY INSOMNIA: ICD-10-CM

## 2019-08-02 DIAGNOSIS — E78.1 HYPERTRIGLYCERIDEMIA: ICD-10-CM

## 2019-08-02 DIAGNOSIS — Z34.91 ENCOUNTER FOR SUPERVISION OF NORMAL PREGNANCY IN FIRST TRIMESTER: ICD-10-CM

## 2019-08-02 LAB
ABO GROUP BLD: NORMAL
ALBUMIN SERPL BCP-MCNC: 3.3 G/DL (ref 3.5–5)
ALP SERPL-CCNC: 66 U/L (ref 46–116)
ALT SERPL W P-5'-P-CCNC: 42 U/L (ref 12–78)
ANION GAP SERPL CALCULATED.3IONS-SCNC: 10 MMOL/L (ref 4–13)
AST SERPL W P-5'-P-CCNC: 22 U/L (ref 5–45)
BACTERIA UR QL AUTO: ABNORMAL /HPF
BASOPHILS # BLD AUTO: 0.03 THOUSANDS/ΜL (ref 0–0.1)
BASOPHILS NFR BLD AUTO: 0 % (ref 0–1)
BILIRUB SERPL-MCNC: 0.42 MG/DL (ref 0.2–1)
BILIRUB UR QL STRIP: NEGATIVE
BLD GP AB SCN SERPL QL: NEGATIVE
BUN SERPL-MCNC: 11 MG/DL (ref 5–25)
CALCIUM SERPL-MCNC: 8.5 MG/DL (ref 8.3–10.1)
CHLORIDE SERPL-SCNC: 108 MMOL/L (ref 100–108)
CHOLEST SERPL-MCNC: 158 MG/DL (ref 50–200)
CLARITY UR: CLEAR
CO2 SERPL-SCNC: 23 MMOL/L (ref 21–32)
COLOR UR: YELLOW
CREAT SERPL-MCNC: 0.48 MG/DL (ref 0.6–1.3)
EOSINOPHIL # BLD AUTO: 0.16 THOUSAND/ΜL (ref 0–0.61)
EOSINOPHIL NFR BLD AUTO: 2 % (ref 0–6)
ERYTHROCYTE [DISTWIDTH] IN BLOOD BY AUTOMATED COUNT: 12.1 % (ref 11.6–15.1)
GFR SERPL CREATININE-BSD FRML MDRD: 128 ML/MIN/1.73SQ M
GLUCOSE SERPL-MCNC: 116 MG/DL (ref 65–140)
GLUCOSE UR STRIP-MCNC: NEGATIVE MG/DL
HCT VFR BLD AUTO: 34.7 % (ref 34.8–46.1)
HDLC SERPL-MCNC: 60 MG/DL (ref 40–60)
HGB BLD-MCNC: 12.3 G/DL (ref 11.5–15.4)
HGB UR QL STRIP.AUTO: NEGATIVE
IMM GRANULOCYTES # BLD AUTO: 0.03 THOUSAND/UL (ref 0–0.2)
IMM GRANULOCYTES NFR BLD AUTO: 0 % (ref 0–2)
KETONES UR STRIP-MCNC: NEGATIVE MG/DL
LDLC SERPL CALC-MCNC: 80 MG/DL (ref 0–100)
LEUKOCYTE ESTERASE UR QL STRIP: ABNORMAL
LYMPHOCYTES # BLD AUTO: 2.63 THOUSANDS/ΜL (ref 0.6–4.47)
LYMPHOCYTES NFR BLD AUTO: 28 % (ref 14–44)
MCH RBC QN AUTO: 31.8 PG (ref 26.8–34.3)
MCHC RBC AUTO-ENTMCNC: 35.4 G/DL (ref 31.4–37.4)
MCV RBC AUTO: 90 FL (ref 82–98)
MONOCYTES # BLD AUTO: 0.41 THOUSAND/ΜL (ref 0.17–1.22)
MONOCYTES NFR BLD AUTO: 4 % (ref 4–12)
MUCOUS THREADS UR QL AUTO: ABNORMAL
NEUTROPHILS # BLD AUTO: 6.14 THOUSANDS/ΜL (ref 1.85–7.62)
NEUTS SEG NFR BLD AUTO: 66 % (ref 43–75)
NITRITE UR QL STRIP: NEGATIVE
NON-SQ EPI CELLS URNS QL MICRO: ABNORMAL /HPF
NRBC BLD AUTO-RTO: 0 /100 WBCS
PH UR STRIP.AUTO: 7 [PH]
PLATELET # BLD AUTO: 156 THOUSANDS/UL (ref 149–390)
PMV BLD AUTO: 12.3 FL (ref 8.9–12.7)
POTASSIUM SERPL-SCNC: 3.8 MMOL/L (ref 3.5–5.3)
PROT SERPL-MCNC: 6.3 G/DL (ref 6.4–8.2)
PROT UR STRIP-MCNC: NEGATIVE MG/DL
RBC # BLD AUTO: 3.87 MILLION/UL (ref 3.81–5.12)
RBC #/AREA URNS AUTO: ABNORMAL /HPF
RH BLD: POSITIVE
RUBV IGG SERPL IA-ACNC: 74 IU/ML
SODIUM SERPL-SCNC: 141 MMOL/L (ref 136–145)
SP GR UR STRIP.AUTO: 1.02 (ref 1–1.03)
SPECIMEN EXPIRATION DATE: NORMAL
TRIGL SERPL-MCNC: 89 MG/DL
TSH SERPL DL<=0.05 MIU/L-ACNC: 1.42 UIU/ML (ref 0.36–3.74)
UROBILINOGEN UR QL STRIP.AUTO: 1 E.U./DL
WBC # BLD AUTO: 9.4 THOUSAND/UL (ref 4.31–10.16)
WBC #/AREA URNS AUTO: ABNORMAL /HPF

## 2019-08-02 PROCEDURE — 84443 ASSAY THYROID STIM HORMONE: CPT

## 2019-08-02 PROCEDURE — 86803 HEPATITIS C AB TEST: CPT

## 2019-08-02 PROCEDURE — 81001 URINALYSIS AUTO W/SCOPE: CPT | Performed by: PHYSICIAN ASSISTANT

## 2019-08-02 PROCEDURE — 80081 OBSTETRIC PANEL INC HIV TSTG: CPT

## 2019-08-02 PROCEDURE — 80053 COMPREHEN METABOLIC PANEL: CPT

## 2019-08-02 PROCEDURE — 80061 LIPID PANEL: CPT

## 2019-08-02 PROCEDURE — 87086 URINE CULTURE/COLONY COUNT: CPT

## 2019-08-02 PROCEDURE — 36415 COLL VENOUS BLD VENIPUNCTURE: CPT

## 2019-08-03 LAB
BACTERIA UR CULT: ABNORMAL
BACTERIA UR CULT: ABNORMAL
HBV SURFACE AG SER QL: NORMAL
HCV AB SER QL: NORMAL

## 2019-08-04 LAB — HIV 1+2 AB+HIV1 P24 AG SERPL QL IA: NORMAL

## 2019-08-05 LAB — RPR SER QL: NORMAL

## 2019-08-19 ENCOUNTER — ROUTINE PRENATAL (OUTPATIENT)
Dept: OBGYN CLINIC | Facility: CLINIC | Age: 34
End: 2019-08-19

## 2019-08-19 VITALS
BODY MASS INDEX: 24.4 KG/M2 | SYSTOLIC BLOOD PRESSURE: 110 MMHG | WEIGHT: 161 LBS | HEIGHT: 68 IN | DIASTOLIC BLOOD PRESSURE: 68 MMHG

## 2019-08-19 DIAGNOSIS — Z34.01 ENCOUNTER FOR SUPERVISION OF NORMAL FIRST PREGNANCY IN FIRST TRIMESTER: Primary | ICD-10-CM

## 2019-08-19 PROCEDURE — PNV: Performed by: PHYSICIAN ASSISTANT

## 2019-08-19 NOTE — PROGRESS NOTES
VISIT: (+) n - still persisting; (+) HA - everyday - tension like - tolerating with excedrin extra strength - used to drink a lot of coffee - now drink one cup in AM so believes caffeine withdrawal; (+) cramping - minimal; (+) edema - hands and feet especially after walking the dog at night; Denies v/vb/lof/dv/smoking; in office urine dip neg for protein/glucose; [facility];  Initial labs done  PNVs + DHA - tolerating daily  No FM yet    RTO in 4 weeks for routine ob check or sooner if needed Acute on chronic diastolic heart failure

## 2019-08-23 ENCOUNTER — ROUTINE PRENATAL (OUTPATIENT)
Dept: PERINATAL CARE | Facility: OTHER | Age: 34
End: 2019-08-23
Payer: COMMERCIAL

## 2019-08-23 VITALS
HEART RATE: 82 BPM | WEIGHT: 161 LBS | SYSTOLIC BLOOD PRESSURE: 114 MMHG | HEIGHT: 68 IN | BODY MASS INDEX: 24.4 KG/M2 | DIASTOLIC BLOOD PRESSURE: 77 MMHG

## 2019-08-23 DIAGNOSIS — O09.511 ELDERLY PRIMIGRAVIDA, FIRST TRIMESTER: Primary | ICD-10-CM

## 2019-08-23 DIAGNOSIS — Z3A.12 12 WEEKS GESTATION OF PREGNANCY: ICD-10-CM

## 2019-08-23 PROCEDURE — 76801 OB US < 14 WKS SINGLE FETUS: CPT | Performed by: OBSTETRICS & GYNECOLOGY

## 2019-08-23 PROCEDURE — 76813 OB US NUCHAL MEAS 1 GEST: CPT | Performed by: OBSTETRICS & GYNECOLOGY

## 2019-08-23 PROCEDURE — 99241 PR OFFICE CONSULTATION NEW/ESTAB PATIENT 15 MIN: CPT | Performed by: OBSTETRICS & GYNECOLOGY

## 2019-08-23 RX ORDER — ASPIRIN 81 MG/1
162 TABLET, CHEWABLE ORAL DAILY
Qty: 180 TABLET | Refills: 1 | Status: SHIPPED | OUTPATIENT
Start: 2019-08-23 | End: 2020-01-24

## 2019-08-23 NOTE — LETTER
August 23, 2019     Chris Hyde MD  [facility] [address]    Patient: Karlo More   YOB: 1985   Date of Visit: 8/23/2019       Dear Dr Ralph Nunez: Thank you for referring Sallie Barbosa to me for evaluation  Below are my notes for this consultation  If you have questions, please do not hesitate to call me  I look forward to following your patient along with you           Sincerely,        Leonardo Thorpe MD        CC: No Recipients

## 2019-08-23 NOTE — PROGRESS NOTES
Thank you very much for your kind referral of Jadyn De La Garza for first-trimester ultrasound evaluation and MFM consult at [facility] on August 23, 2019  Abimael Salgado is a 22-year-old primigravida who is currently at 15 and 6/7 weeks gestation by an estimated due date of February 29, 2020 which is based upon menstrual dating  She presents for the indication of advanced maternal age  Abimael Salgado will be 28years old at her estimated due date  Her prenatal course so far has been unremarkable  Abimael Salgado has no complaints  She denies vaginal bleedingSarah has an unremarkable past medical history  She had a history of eye surgery at age [age]  Her past surgical history is otherwise negative  Abimael Salgado takes no medication with the exception of a prenatal vitamin on a daily basis  She takes Unisom as needed  She has no known drug allergy  Abimael Salgado denies tobacco, alcohol, or illicit drug use during the pregnancy  The family medical history is negative with respect to first-degree relatives with diabetes, hypertension, venous thromboembolism, or preeclampsia  The family genetic history is negative with respect to genetic abnormalities, birth defects, or mental retardation  Today's ultrasound findings and suggested follow-up were discussed in detail with  Abimael Salgado and her   At age 28, her risk for a live-born baby with Down syndrome is one in 56, with a risk for a live-born baby with any chromosomal abnormality of one in 46  We discussed that definitive prenatal diagnosis is possible only with genetic amniocentesis or chorionic villous sampling, and discussed the small procedure-related risk for pregnancy loss in each case  We then discussed the option of genetic screening using cell free DNA analysis which is not diagnostic, but which has a sensitivity for identification of Down syndrome of 99%  Abimael Salgado would like to pursue cell free DNA analysis    Testing will be performed at [facility] once preauthorization is confirmed by her insurance company  She will return at 20 weeks gestation for detailed MFM fetal anatomy assessment  We discussed the importance of receiving the influenza vaccine during pregnancy  Susana's age and history of nulliparity increase her risk for preeclampsia  I recommended that she initiate prophylaxis with 162 mg of aspirin a day, which will significantly reduce her risk  Aspirin was ordered for Susana at her visit today through her pharmacy  Continuation of daily low dose aspirin therapy is recommended until delivery  The face to face time, in addition to time spent discussing ultrasound results, was approximately 15 minutes, greater than 50% of which was spent during counseling and coordination of care

## 2019-09-03 ENCOUNTER — TELEPHONE (OUTPATIENT)
Dept: PERINATAL CARE | Facility: CLINIC | Age: 34
End: 2019-09-03

## 2019-09-03 NOTE — TELEPHONE ENCOUNTER
Received approval from insurance for Mauricio Haley (EKYT #[id number])  Left message for patient letting her know it was approve and she can get her blood drawn at any [facility] location  Provided my contact number for any questions or concerns

## 2019-09-23 ENCOUNTER — OFFICE VISIT (OUTPATIENT)
Dept: PERINATAL CARE | Facility: CLINIC | Age: 34
End: 2019-09-23

## 2019-09-23 ENCOUNTER — ROUTINE PRENATAL (OUTPATIENT)
Dept: OBGYN CLINIC | Facility: CLINIC | Age: 34
End: 2019-09-23

## 2019-09-23 VITALS
DIASTOLIC BLOOD PRESSURE: 67 MMHG | SYSTOLIC BLOOD PRESSURE: 109 MMHG | WEIGHT: 166.4 LBS | HEIGHT: 68 IN | BODY MASS INDEX: 25.22 KG/M2 | HEART RATE: 73 BPM

## 2019-09-23 VITALS
BODY MASS INDEX: 24.86 KG/M2 | DIASTOLIC BLOOD PRESSURE: 68 MMHG | HEIGHT: 68 IN | SYSTOLIC BLOOD PRESSURE: 106 MMHG | WEIGHT: 164 LBS

## 2019-09-23 DIAGNOSIS — O35.2XX0 HEREDITARY DISEASE IN FAMILY POSSIBLY AFFECTING FETUS, AFFECTING MANAGEMENT OF MOTHER IN PREGNANCY, SINGLE OR UNSPECIFIED FETUS: ICD-10-CM

## 2019-09-23 DIAGNOSIS — Z3A.17 PREGNANCY WITH 17 COMPLETED WEEKS GESTATION: Primary | ICD-10-CM

## 2019-09-23 DIAGNOSIS — Z31.5 ENCOUNTER FOR PROCREATIVE GENETIC COUNSELING: ICD-10-CM

## 2019-09-23 DIAGNOSIS — O09.519 ADVANCED MATERNAL AGE, PRIMIGRAVIDA, ANTEPARTUM: Primary | ICD-10-CM

## 2019-09-23 PROCEDURE — PNV: Performed by: OBSTETRICS & GYNECOLOGY

## 2019-09-23 NOTE — PROGRESS NOTES
Patient reports no fm, no n/v, bleeding, loss of fluid,  dom violence, or smoking  melquiades pnv   Some headache feels is related to decreased caffiene intake, reviewed, urine neg/neg will call pnc for level 2

## 2019-09-25 NOTE — PROGRESS NOTES
Genetic Counseling   High-Risk Gestation Note    Appointment Date:  2019  Referred By: Nell Rodarte MD  YOB: 1985  Partner:  Rai Kirkpatrick     Indication for Visit:  advanced maternal age    Pregnancy History:   Estimated Date of Delivery: 20  Estimated Gestational Age: 17w4d     Genetic Counseling: Bon Cole is a 29year old female who is here to discuss maternal age related risks for aneuploidy  Issues Discussed:  average population risk- 3-4% in the average pregnancy of serious condition or birth defect  2-3% risk of mental retardation  Not all detected by prenatal testing  Chromosomal: non-disjunction-  for Down syndrome and  for any chromosome abnormality at age 28 at delivery  Mode of inheritance/mechanism:  multifactorial inheritance for ADD/ADHD    Options Discussed:  Amniocentesis-risks and limitations discussed  Ethnic screening discussed-clinical and genetic basis of CF, SMA, expanded carrier screening  Variability and treatment addressed  Level II ultrasound to screen for structural anomalies  Serum AFP screen recommended at 16-18 weeks to check for open neural tube defects  Cell free fetal DNA testing  Additional Information / Impression:  Bon Cole is a 29 y o  female who presented for genetic counseling to discuss maternal age related risks for aneuploidy as noted above  The testing and screening options were discussed  The risks, benefits, and limitations of amniocentesis were discussed with the patient  Amniocentesis is performed under direct real time ultrasound visualization to avoid both the fetus and the placenta  Once amniotic fluid is withdrawn, laboratory analysis is performed and amniotic fluid alpha-fetoprotein, as well as chromosome analysis is undertaken    The risk of genetic amniocentesis includes, but is not limited to less than 1 in 300 pregnancy loss rate or  delivery rate if 23 weeks or greater, infection, bleeding, rupture of membranes, failure of cells to grow, karyotype error, laboratory error, etc   Occasionally a repeat amniocentesis is necessary due to cell culture failure  Chromosome analysis from amniocentesis is 99 9% accurate and alpha-fetoprotein analysis can detect approximately 95% of open neural tube defects  We reviewed the testing option of cell free fetal DNA screening (also known as noninvasive prenatal testing or NIPT)  We discussed that it is a serum test to identify fragments of fetal DNA in maternal blood  We reviewed the benefits and limitations of cell free fetal DNA screening in detecting Down syndrome, Trisomy 13, Trisomy 25 and sex chromosome aneuploidies  We also discussed that cell free fetal DNA screening does not detect additional chromosomal abnormalities and the possibility of a failed test result  As cell free fetal DNA screening does not detect open neural tube defects, MSAFP screening is available at 15-20 weeks gestation  We reviewed that level II anatomy ultrasound is typically performed at approximately 20 weeks gestation  Level II ultrasound evaluation is between 60-80% accurate in detecting major physical birth defects and variations in fetal development that may be associated with chromosome abnormalities  Level II ultrasound evaluation is not able to detect all birth defects or health problems  After discussing the available prenatal screening and testing options Esther Mcclain elected to pursue cell free fetal DNA screening  A [facility] lab slip was provided to the patient to be drawn at the lab  Results take approximately 7-10 days  The patient declined amniocentesis secondary to procedural related complications  She may reconsider diagnostic testing should the cell free fetal DNA screening come back abnormal   Esther Mcclain is also planning on pursuing MSAFP screening and Level II ultrasound at the appropriate times      Histories were taken on the patient and her partner's families and was noncontributory  Juan A Andrews has a personal diagnosis of ADD, thus we discussed the multifactorial inheritance and hereditary component of ADD, ADHD, and learning differences  Based on this history the risk to the current pregnancy is up to 50% as autosomal dominant inheritance cannot be ruled out  The family history was not significant for other genetic diseases or disorders, intellectual disability, birth defects, fetal loss, or consanguinity  Patient reports being of Polish/Upper sorbian/[place]an decent and that her  is of Upper sorbian/Bah[place]ian decent  She denies either of them having known Ashkenazi Hinduism ancestry  The benefits and limitations of Cystic fibrosis (CF), Spinal muscular atrophy (SMA), and expanded carrier screening was discussed  The patient declined expanded carrier screening, however elected to pursue CF and SMA carrier screening pending insurance approval   She will be contacted for her blood draw once approval is obtained  Hemoglobin electrophoresis to screen for hemoglobinopathies is recommended through her referring OB provider if not previously performed  Lastly, we discussed the fact that everyone in the general population regardless of age, family history, or medical background has approximately a 3-5% risk of having a child with some type of congenital anomaly, genetic disease or intellectual disability  Currently there are no tests available to rule out all birth defects or health problems  Billmari Jessika was provided with take home literature and our contact information  I encouraged her to call with any questions or concerns  Time spent with Genetic Counselor: 45 minutes    Plan / Tests Ordered:  1) Patient declined amniocentesis and expanded carrier screening  2) Patient elected cell free fetal DNA testing - QNatal labslip provided to patient  3) Patient elected CF and SMA carrier screening pending insurance approval   4) MSAFP screening by 21 weeks gestation    5) Level II ultrasound at approximately 20 weeks gestation

## 2019-09-29 LAB
# FETUSES US: 1
CFDNA.FET/TOTAL PLAS.CFDNA: NORMAL
FET CHR 13 TS PLAS.CFDNA QL: NEGATIVE
FET CHR 18 TS PLAS.CFDNA QL: NEGATIVE
FET CHR 21 TS PLAS.CFDNA QL: NEGATIVE
FET CHR X + Y ANEUP PLAS.CFDNA QL: NORMAL
FET CHROM X + Y ANEUP CFDNA IMP: NORMAL
FET Y CHROM PLAS.CFDNA QL: NOT DETECTED
FET Y CHROM PLAS.CFDNA: NORMAL
GA (DAYS): 3 D
GA (WEEKS): 17 WK
MICRODELETION SYND BLD/T FISH: NORMAL
REF LAB TEST METHOD: NORMAL
SERVICE CMNT-IMP: NORMAL
SERVICE CMNT-IMP: NORMAL
SL AMB ABNORMAL MSS?: NORMAL
SL AMB ABNORMAL US?: NORMAL
SL AMB ADVANCED MATERNAL AGE?: NORMAL
SL AMB MICRODELETION INTERP: NORMAL
SL AMB MICRODELETION: NOT DETECTED
SL AMB PERSONAL/FAM HISTORY?: NORMAL
SL AMB SPECIFICATIONS: NORMAL

## 2019-10-03 ENCOUNTER — TELEPHONE (OUTPATIENT)
Dept: PERINATAL CARE | Facility: CLINIC | Age: 34
End: 2019-10-03

## 2019-10-03 NOTE — LETTER
10/03/19  John Collins Stellate  1985    Thank you for completing the cell-free DNA screen ("non-invasive prenatal screening" or "Qnatal")  To obtain comprehensive screening results, please complete blood work for MSAFP (maternal-serum alpha fetoprotein) by 10/28/19  Based on your insurance coverage, please use one of the following locations  The other option is to go to [email or web address]  Call our office for any questions at [phone]          Sincerely,    Marcelo Medina RN

## 2019-10-03 NOTE — TELEPHONE ENCOUNTER
----- Message from Toro Duke MD sent at 9/30/2019  3:26 PM EDT -----  I reviewed the lab study today and the results are normal

## 2019-10-03 NOTE — TELEPHONE ENCOUNTER
I left a message for Franco  this evening to report the results of her Qnatal, which were negative  I also notified her of the date to get the 2nd part drawn and where to go for this  I offered the nurse line phone number for questions

## 2019-10-04 ENCOUNTER — ROUTINE PRENATAL (OUTPATIENT)
Dept: PERINATAL CARE | Facility: CLINIC | Age: 34
End: 2019-10-04
Payer: COMMERCIAL

## 2019-10-04 VITALS
HEIGHT: 60 IN | SYSTOLIC BLOOD PRESSURE: 110 MMHG | DIASTOLIC BLOOD PRESSURE: 72 MMHG | WEIGHT: 162.4 LBS | BODY MASS INDEX: 31.88 KG/M2 | HEART RATE: 78 BPM

## 2019-10-04 DIAGNOSIS — Z36.86 ENCOUNTER FOR ANTENATAL SCREENING FOR CERVICAL LENGTH: ICD-10-CM

## 2019-10-04 DIAGNOSIS — Z3A.18 18 WEEKS GESTATION OF PREGNANCY: ICD-10-CM

## 2019-10-04 DIAGNOSIS — O09.522 ELDERLY MULTIGRAVIDA, SECOND TRIMESTER: Primary | ICD-10-CM

## 2019-10-04 PROCEDURE — 99212 OFFICE O/P EST SF 10 MIN: CPT | Performed by: OBSTETRICS & GYNECOLOGY

## 2019-10-04 PROCEDURE — 76817 TRANSVAGINAL US OBSTETRIC: CPT | Performed by: OBSTETRICS & GYNECOLOGY

## 2019-10-04 PROCEDURE — 76811 OB US DETAILED SNGL FETUS: CPT | Performed by: OBSTETRICS & GYNECOLOGY

## 2019-10-04 NOTE — PROGRESS NOTES
Please refer to the [facility] ultrasound report in Ob Procedures for additional information regarding the visit to the [facility]  today

## 2019-10-04 NOTE — LETTER
October 4, 2019     Bhavesh Jin MD  [address]    Patient: Amy More   YOB: 1985   Date of Visit: 10/4/2019       Dear Dr Keny Jay: Thank you for referring Cynthia Hayden to me for evaluation  Below are my notes for this consultation  If you have questions, please do not hesitate to call me  I look forward to following your patient along with you  Sincerely,        Valeria Mcpherson MD        CC: No Recipients  Valeria Mcpherson MD  10/4/2019  8:46 AM  Sign at close encounter  Please refer to the [facility] ultrasound report in Ob Procedures for additional information regarding the visit to the [facility]  today

## 2019-10-04 NOTE — PROGRESS NOTES
A transvaginal ultrasound was performed  Sonographer note on use of High Level Disinfection Process (Trophon) for transvaginal probe# 1 used, serial # [id number]    Brady Trinh RDMS

## 2019-10-16 ENCOUNTER — TELEPHONE (OUTPATIENT)
Dept: OBGYN CLINIC | Facility: CLINIC | Age: 34
End: 2019-10-16

## 2019-10-16 NOTE — TELEPHONE ENCOUNTER
Patient called c/c of cramping and pain in belly  It would come and go  Patient stated she thinks she can hydrate more then what she is doing currently  I also advised patient to try water or drinks with electrolytes  Also advised patient to get a belly belt  Based on descriptions it seems like marjan rudolph  Hydration and belly belt should help   Patient aware to call office if cramping/ tugging feeling consist

## 2019-10-17 ENCOUNTER — TELEPHONE (OUTPATIENT)
Dept: PERINATAL CARE | Facility: CLINIC | Age: 34
End: 2019-10-17

## 2019-10-17 DIAGNOSIS — Z13.71 TESTING OF FEMALE FOR GENETIC DISEASE CARRIER STATUS: Primary | ICD-10-CM

## 2019-10-17 NOTE — TELEPHONE ENCOUNTER
Received notice from patient's insurance that CF and SMA carrier screening was approved (Auth # [id number], good from 10/14/19-12/9/19)  Called patient and left message to let her know prior Rubio Bojorquez was obtained and she can get her blood drawn at any [address] [place] [address] location  Patricia Amos will be mailed to her  She will be contacted when results are available  Provided our phone number for any questions or concerns

## 2019-10-18 ENCOUNTER — ROUTINE PRENATAL (OUTPATIENT)
Dept: OBGYN CLINIC | Facility: CLINIC | Age: 34
End: 2019-10-18

## 2019-10-18 VITALS
HEIGHT: 68 IN | SYSTOLIC BLOOD PRESSURE: 106 MMHG | BODY MASS INDEX: 25.43 KG/M2 | DIASTOLIC BLOOD PRESSURE: 74 MMHG | WEIGHT: 167.8 LBS

## 2019-10-18 DIAGNOSIS — Z3A.20 PREGNANCY WITH 20 COMPLETED WEEKS GESTATION: Primary | ICD-10-CM

## 2019-10-18 PROCEDURE — PNV: Performed by: OBSTETRICS & GYNECOLOGY

## 2019-10-18 NOTE — PROGRESS NOTES
Patient reports no fm, no n/v, headache, cramping, bleeding, loss of fluid, edema, dom violence, or smoking  melquiades pnv  Some round ligament, urine neg/neg, some round ligament, has pnc follow up, return in 4 weeks or sooner as needed  Discussed cf and sma testing, patient has high deductible and may opt not to test for now

## 2019-11-11 ENCOUNTER — ROUTINE PRENATAL (OUTPATIENT)
Dept: OBGYN CLINIC | Facility: CLINIC | Age: 34
End: 2019-11-11

## 2019-11-11 VITALS
BODY MASS INDEX: 25.79 KG/M2 | WEIGHT: 170.2 LBS | DIASTOLIC BLOOD PRESSURE: 68 MMHG | SYSTOLIC BLOOD PRESSURE: 102 MMHG | HEIGHT: 68 IN

## 2019-11-11 DIAGNOSIS — Z34.02 ENCOUNTER FOR SUPERVISION OF NORMAL FIRST PREGNANCY IN SECOND TRIMESTER: Primary | ICD-10-CM

## 2019-11-11 PROBLEM — Z34.91 VIABLE PREGNANCY IN FIRST TRIMESTER: Status: RESOLVED | Noted: 2019-07-17 | Resolved: 2019-11-11

## 2019-11-11 PROCEDURE — PNV: Performed by: PHYSICIAN ASSISTANT

## 2019-11-11 NOTE — PROGRESS NOTES
VISIT: Denies n/v/HA/cramping/vb/lof/edema/dv/smoking; in office urine dip negative protein/glucose; follow-up PNC growth 32 weeks  PNVs + DHA - tolerating daily  Good FM;    Flu done;    28 week labs slip given and reviewed  RTO in 4 weeks for routine ob check or sooner if needed

## 2019-11-12 ENCOUNTER — TELEPHONE (OUTPATIENT)
Dept: OBGYN CLINIC | Facility: CLINIC | Age: 34
End: 2019-11-12

## 2019-11-12 NOTE — TELEPHONE ENCOUNTER
Patient called in with some red spotting on tissue after going to the bathroom and being constipated  Pt advised to watch, increase hydration and try colace going forward   Will call and check on patient later in afternoon, but if persists or worsens to call back sooner

## 2019-11-12 NOTE — TELEPHONE ENCOUNTER
Spoke with patient to check on her  No spotting since this am when she called  Will continue to hydrate and colace as needed  Patient states has not had as much movement today but generally feels her at night   Pt advised to drink cold water and watch over next couple hours and if doesn't have 10 in 2 to please call back

## 2019-11-29 LAB
BASOPHILS # BLD AUTO: 36 CELLS/UL (ref 0–200)
BASOPHILS NFR BLD AUTO: 0.3 %
EOSINOPHIL # BLD AUTO: 131 CELLS/UL (ref 15–500)
EOSINOPHIL NFR BLD AUTO: 1.1 %
ERYTHROCYTE [DISTWIDTH] IN BLOOD BY AUTOMATED COUNT: 12.8 % (ref 11–15)
GLUCOSE 1H P 50 G GLC PO SERPL-MCNC: 127 MG/DL
HCT VFR BLD AUTO: 34.2 % (ref 35–45)
HGB BLD-MCNC: 11.8 G/DL (ref 11.7–15.5)
LYMPHOCYTES # BLD AUTO: 2499 CELLS/UL (ref 850–3900)
LYMPHOCYTES NFR BLD AUTO: 21 %
MCH RBC QN AUTO: 32 PG (ref 27–33)
MCHC RBC AUTO-ENTMCNC: 34.5 G/DL (ref 32–36)
MCV RBC AUTO: 92.7 FL (ref 80–100)
MONOCYTES # BLD AUTO: 536 CELLS/UL (ref 200–950)
MONOCYTES NFR BLD AUTO: 4.5 %
NEUTROPHILS # BLD AUTO: 8699 CELLS/UL (ref 1500–7800)
NEUTROPHILS NFR BLD AUTO: 73.1 %
PLATELET # BLD AUTO: 157 THOUSAND/UL (ref 140–400)
PMV BLD REES-ECKER: 11.7 FL (ref 7.5–12.5)
RBC # BLD AUTO: 3.69 MILLION/UL (ref 3.8–5.1)
RPR SER QL: NORMAL
WBC # BLD AUTO: 11.9 THOUSAND/UL (ref 3.8–10.8)

## 2019-12-10 ENCOUNTER — ROUTINE PRENATAL (OUTPATIENT)
Dept: OBGYN CLINIC | Facility: CLINIC | Age: 34
End: 2019-12-10

## 2019-12-10 VITALS — BODY MASS INDEX: 26.76 KG/M2 | WEIGHT: 176 LBS | SYSTOLIC BLOOD PRESSURE: 108 MMHG | DIASTOLIC BLOOD PRESSURE: 72 MMHG

## 2019-12-10 DIAGNOSIS — Z34.03 ENCOUNTER FOR SUPERVISION OF NORMAL FIRST PREGNANCY IN THIRD TRIMESTER: Primary | ICD-10-CM

## 2019-12-10 PROCEDURE — PNV: Performed by: PHYSICIAN ASSISTANT

## 2019-12-10 NOTE — PROGRESS NOTES
Visit: Good FM, No N/V, HA, cramping, VB, LOF, edema, domestic violence, or smoking  Tolerating PNV  Reviewed normal 28 week lab work  Breastfeeding talk done today, patient is planning on breastfeeding  Continue routine prenatal care  Return to office in 2 weeks for ob check

## 2019-12-22 PROBLEM — O43.193 MARGINAL INSERTION OF UMBILICAL CORD AFFECTING MANAGEMENT OF MOTHER IN THIRD TRIMESTER: Status: ACTIVE | Noted: 2019-12-22

## 2019-12-22 NOTE — PATIENT INSTRUCTIONS
Thank you for choosing us for your  care today  If you have any questions about your ultrasound or care, please do not hesitate to contact us or your primary obstetrician  At this time, no additional ultrasounds are advised through the  center, however, if your doctors would like you to have any additional ultrasounds, they will let us know  Some general instructions for your pregnancy are:     Exercise: we encourage most pregnant women to get regular physical activity in pregnancy  Exercise has been shown to reduce the risk of several pregnancy-related complications  Unless instructed otherwise, you can aim for 22 minutes per day (150 minutes per week! )   Nutrition: aim for calcium-rich and iron-rich foods as well as healthy sources of protein   Weight: ask your doctor what is the appropriate amount of weight for you to gain in pregnancy  We have nutritionists here if you would like to meet with them   Protection from influenza: get yourself and your entire household vaccinated against influenza  Tell your partner to get vaccinated as well  Good hand hygiene can reduce the spread of this potentially deadly virus  Insist that everyone who is going to hold or be around your baby get vaccinated   Educate yourself about preeclampsia: preeclampsia is a common, serious complication in pregnancy  A blood pressure of 140mmHg (top number or systolic) OR 35LMNT (bottom number or diastolic) is elevated and needs evaluation by your doctor  Ask your doctor early in pregnancy if you should take aspirin (not motrin or tylenol) to prevent preeclampsia  If you were advised to take aspirin to prevent preeclampsia, a daily dose of 162mg or 81mg is advised  One resource to learn more is www  preeclampsia org    If you smoke, try to reduce how many cigarettes you smoke or quit completely  Do not vape       Other warning signs to watch out for in pregnancy or postpartum: chest pain, obstructed breathing or shortness of breath, seizures, thoughts of hurting yourself or your baby, bleeding, a painful or swollen leg, fever, or headache ([facility] Warning Signs [facility])  If these happen call 911  Itching is also not normal in pregnancy and if you experience this, especially over your hands and feet, potentially worse at night, notify your doctors

## 2019-12-27 ENCOUNTER — ULTRASOUND (OUTPATIENT)
Dept: PERINATAL CARE | Facility: CLINIC | Age: 34
End: 2019-12-27
Payer: COMMERCIAL

## 2019-12-27 VITALS
HEART RATE: 65 BPM | HEIGHT: 68 IN | BODY MASS INDEX: 26.4 KG/M2 | WEIGHT: 174.2 LBS | SYSTOLIC BLOOD PRESSURE: 104 MMHG | DIASTOLIC BLOOD PRESSURE: 68 MMHG

## 2019-12-27 DIAGNOSIS — IMO0002 EVALUATE ANATOMY NOT SEEN ON PRIOR SONOGRAM: ICD-10-CM

## 2019-12-27 DIAGNOSIS — O43.193 MARGINAL INSERTION OF UMBILICAL CORD AFFECTING MANAGEMENT OF MOTHER IN THIRD TRIMESTER: ICD-10-CM

## 2019-12-27 DIAGNOSIS — Z36.89 ENCOUNTER FOR ULTRASOUND TO CHECK FETAL GROWTH: Primary | ICD-10-CM

## 2019-12-27 DIAGNOSIS — Z3A.30 30 WEEKS GESTATION OF PREGNANCY: ICD-10-CM

## 2019-12-27 PROCEDURE — 76816 OB US FOLLOW-UP PER FETUS: CPT | Performed by: OBSTETRICS & GYNECOLOGY

## 2019-12-27 PROCEDURE — 99212 OFFICE O/P EST SF 10 MIN: CPT | Performed by: OBSTETRICS & GYNECOLOGY

## 2019-12-27 NOTE — PROGRESS NOTES
Jose Giovanni: Ms Jimmie Nunez was seen today at 30w6d for fetal growth and followup missed anatomy ultrasound  See ultrasound report under "OB Procedures" tab  Please don't hesitate to contact our office with any concerns or questions    Leandra Ovalle MD

## 2020-01-09 ENCOUNTER — TELEPHONE (OUTPATIENT)
Dept: OBGYN CLINIC | Facility: CLINIC | Age: 35
End: 2020-01-09

## 2020-01-09 NOTE — TELEPHONE ENCOUNTER
Pt aware no pepto   Also reviewed pepcid daily, no fatty foods, watch intake at end of night, sleep elevated and if no improvement to call back

## 2020-01-10 NOTE — PATIENT INSTRUCTIONS
Kick Counts in Pregnancy   AMBULATORY CARE:   Kick counts  measure how much your baby is moving in your womb  A kick from your baby can be felt as a twist, turn, swish, roll, or jab  It is common to feel your baby kicking at 26 to 28 weeks of pregnancy  You may feel your baby kick as early as 20 weeks of pregnancy  Seek care immediately if:   · You feel your baby kick less as the day goes on      · You do not feel any kicks in a day  Contact your healthcare provider if:   · You feel a change in the number of kicks or movements of your baby  · You feel fewer than 10 kicks within 2 hours after counting twice  · You have questions or concerns about your baby's movements  Why measure kick counts:  Your baby's movement may provide information about your baby's health  He may move less, or not at all, if there are problems  He may move less if he does not have enough room to grow in your uterus (womb)  He may also move less if he is not getting enough oxygen or nutrition from the placenta  Tell your healthcare provider as soon as you feel a change in your baby's movements  Problems that are found earlier are easier to treat  When to measure kick counts:   · Measure kick counts at the same time every day  · Measure kick counts when your baby is awake and most active  Your baby may be most active in the evening  · Measure kick counts after a meal or snack  Your baby may be more active after you eat  Wait 2 hours after you drink liquids that contain caffeine  Caffeine can make your baby more active than usual     · You should not smoke while you are pregnant  Smoking increases the risk of health problems for you and for your baby during your pregnancy  If you do smoke, wait 2 hours to measure kick counts  Nicotine can make your baby more active than usual   How to measure kick counts:  Check that your baby is awake before you measure kick counts   You can wake up your baby by lightly pushing on your belly, walking, or drinking something cold  Your healthcare provider may tell you different ways to measure kick counts  He may tell you to do the following:  · Use a chart or clock to keep track of the time you start and finish counting  · Sit in a chair or lie on your left side  · Place your hands on the largest part of your belly  · Count until you reach 10 kicks  Write down how much time it takes to count 10 kicks  · It may take 30 minutes to 2 hours to count 10 kicks  It should not take more than 2 hours to count 10 kicks  · If you do not feel 10 kicks within 2 hours, wait 1 hour and count again  Your baby can sleep for up to 40 minutes at one time  Follow up with your healthcare provider as directed:  Write down your questions so you remember to ask them during your visits  © 2017 [employer] Information is for End User's use only and may not be sold, redistributed or otherwise used for commercial purposes  All illustrations and images included in CareNotes® are the copyrighted property of [employer]  or Ariel Thompson  The above information is an  only  It is not intended as medical advice for individual conditions or treatments  Talk to your doctor, nurse or pharmacist before following any medical regimen to see if it is safe and effective for you  Pregnancy at [address] to [address]:   What changes are happening in my body? You may continue to have symptoms such as shortness of breath, heartburn, contractions, or swelling of your ankles and feet  You may be gaining about 1 pound a week now  How do I care for myself at this stage of my pregnancy? · Eat a variety of healthy foods  Healthy foods include fruits, vegetables, whole-grain breads, low-fat dairy foods, beans, lean meats, and fish  Drink liquids as directed  Ask how much liquid to drink each day and which liquids are best for you   Limit caffeine to less than 200 milligrams each day  Limit your intake of fish to 2 servings each week  Choose fish low in mercury such as canned light tuna, shrimp, salmon, cod, or tilapia  Do not  eat fish high in mercury such as swordfish, tilefish, azar mackerel, and shark  · Manage heartburn  by eating 4 or 5 small meals each day instead of large meals  Avoid spicy food  · Manage swelling  by lying down and putting your feet up  · Take prenatal vitamins as directed  Your need for certain vitamins and minerals, such as folic acid, increases during pregnancy  Prenatal vitamins provide some of the extra vitamins and minerals you need  Prenatal vitamins may also help to decrease the risk of certain birth defects  · Talk to your healthcare provider about exercise  Moderate exercise can help you stay fit  Your healthcare provider will help you plan an exercise program that is safe for you during pregnancy  · Do not smoke  If you smoke, it is never too late to quit  Smoking increases your risk of a miscarriage and other health problems during your pregnancy  Smoking can cause your baby to be born too early or weigh less at birth  Ask your healthcare provider for information if you need help quitting  · Do not drink alcohol  Alcohol passes from your body to your baby through the placenta  It can affect your baby's brain development and cause fetal alcohol syndrome (FAS)  FAS is a group of conditions that causes mental, behavior, and growth problems  · Talk to your healthcare provider before you take any medicines  Many medicines may harm your baby if you take them when you are pregnant  Do not take any medicines, vitamins, herbs, or supplements without first talking to your healthcare provider  Never use illegal or street drugs (such as marijuana or cocaine) while you are pregnant  What are some safety tips during pregnancy? · Avoid hot tubs and saunas    Do not use a hot tub or sauna while you are pregnant, especially during your first trimester  Hot tubs and saunas may raise your baby's temperature and increase the risk of birth defects  · Avoid toxoplasmosis  This is an infection caused by eating raw meat or being around infected cat feces  It can cause birth defects, miscarriages, and other problems  Wash your hands after you touch raw meat  Make sure any meat is well-cooked before you eat it  Avoid raw eggs and unpasteurized milk  Use gloves or ask someone else to clean your cat's litter box while you are pregnant  What changes are happening with my baby? By 34 weeks, your baby may weigh more than 5 pounds  Your baby will be about 12 ½ inches long from the top of the head to the rump (baby's bottom)  Your baby is gaining about ½ pound a week  Your baby's eyes open and close now  Your baby's kicks and movements are more forceful at this time  What do I need to know about prenatal care? Your healthcare provider will check your blood pressure and weight  You may also need the following:  · A urine test  may also be done to check for sugar and protein  These can be signs of gestational diabetes or infection  Protein in your urine may also be a sign of preeclampsia  Preeclampsia is a condition that can develop during week 20 or later of your pregnancy  It causes high blood pressure, and it can cause problems with your kidneys and other organs  · A Tdap vaccine  may be recommended by your healthcare provider  · Fundal height  is a measurement of your uterus to check your baby's growth  This number is usually the same as the number of weeks that you have been pregnant  Your healthcare provider may also check your baby's position  · Your baby's heart rate  will be checked  When should I seek immediate care? · You develop a severe headache that does not go away  · You have new or increased vision changes, such as blurred or spotted vision      · You have new or increased swelling in your face or hands     · You have vaginal spotting or bleeding  · Your water broke or you feel warm water gushing or trickling from your vagina  When should I contact my healthcare provider? · You have more than 5 contractions in 1 hour  · You notice any changes in your baby's movements  · You have abdominal cramps, pressure, or tightening  · You have a change in vaginal discharge  · You have chills or a fever  · You have vaginal itching, burning, or pain  · You have yellow, green, white, or foul-smelling vaginal discharge  · You have pain or burning when you urinate, less urine than usual, or pink or bloody urine  · You have questions or concerns about your condition or care  CARE AGREEMENT:   You have the right to help plan your care  Learn about your health condition and how it may be treated  Discuss treatment options with your caregivers to decide what care you want to receive  You always have the right to refuse treatment  The above information is an  only  It is not intended as medical advice for individual conditions or treatments  Talk to your doctor, nurse or pharmacist before following any medical regimen to see if it is safe and effective for you  © 2017 [employer] Information is for End User's use only and may not be sold, redistributed or otherwise used for commercial purposes  All illustrations and images included in CareNotes® are the copyrighted property of [employer]  or Ariel Thompson

## 2020-01-13 ENCOUNTER — ROUTINE PRENATAL (OUTPATIENT)
Dept: OBGYN CLINIC | Facility: CLINIC | Age: 35
End: 2020-01-13
Payer: COMMERCIAL

## 2020-01-13 VITALS — WEIGHT: 177 LBS | BODY MASS INDEX: 26.91 KG/M2 | SYSTOLIC BLOOD PRESSURE: 110 MMHG | DIASTOLIC BLOOD PRESSURE: 76 MMHG

## 2020-01-13 DIAGNOSIS — O26.893 HEARTBURN DURING PREGNANCY IN THIRD TRIMESTER: Primary | ICD-10-CM

## 2020-01-13 DIAGNOSIS — R12 HEARTBURN DURING PREGNANCY IN THIRD TRIMESTER: Primary | ICD-10-CM

## 2020-01-13 DIAGNOSIS — Z3A.33 33 WEEKS GESTATION OF PREGNANCY: ICD-10-CM

## 2020-01-13 PROCEDURE — PNV: Performed by: NURSE PRACTITIONER

## 2020-01-13 PROCEDURE — 90715 TDAP VACCINE 7 YRS/> IM: CPT

## 2020-01-13 PROCEDURE — 90471 IMMUNIZATION ADMIN: CPT

## 2020-01-13 RX ORDER — OMEPRAZOLE 40 MG/1
40 CAPSULE, DELAYED RELEASE ORAL DAILY
Qty: 90 CAPSULE | Refills: 0 | Status: SHIPPED | OUTPATIENT
Start: 2020-01-13 | End: 2020-04-24 | Stop reason: ALTCHOICE

## 2020-01-13 NOTE — PROGRESS NOTES
Denies loss of fluid, vaginal bleeding and abdominal pain  Confirms frequent fetal movement  Doing fetal kick counts daily  Tolerating prenatal vitamins and low-dose aspirin well  Patient complains of increased heartburn not resolved with Tums  Has been taking omeprazole 40 mg with good results-would like prescription  Reviewed recommendations for both influenza and Tdap vaccines patient is agreeable to receive Tdap vaccine today  Patient states she had flu vaccine at work in October  No questions or concerns during today's visit   Center ultrasound reviewed-19-vertex presentation, normal appearing fetal growth, anterior placenta, no placenta previa, GRIS-WNL and EFW 3 lb 10 oz (41%)  Recommendations reviewed for follow-up as clinically indicated  BP:  110/76    Weight: +21 lb   Urine:  Negative protein/glucose  Plan:  1  Continue prenatal vitamins and low-dose aspirin daily  2  Continue fetal kick counts daily  3  Reviewed perineal/vaginal massage, encouraged 1-4 times per week and written information provided  4  Thirty week packet provided-patient encouraged to complete birth plan, breast pump slip and make appointment for checking in visit  5  Tdap vaccine today  6   Common discomforts of pregnancy and precautions including  labor reviewed  RTO 2 weeks

## 2020-01-24 DIAGNOSIS — Z3A.12 12 WEEKS GESTATION OF PREGNANCY: ICD-10-CM

## 2020-01-24 RX ORDER — ASPIRIN 81 MG/1
TABLET, CHEWABLE ORAL
Qty: 180 TABLET | Refills: 0 | Status: SHIPPED | OUTPATIENT
Start: 2020-01-24 | End: 2020-03-02 | Stop reason: HOSPADM

## 2020-01-27 ENCOUNTER — ROUTINE PRENATAL (OUTPATIENT)
Dept: OBGYN CLINIC | Facility: CLINIC | Age: 35
End: 2020-01-27

## 2020-01-27 VITALS
DIASTOLIC BLOOD PRESSURE: 74 MMHG | BODY MASS INDEX: 27.46 KG/M2 | WEIGHT: 181.2 LBS | HEIGHT: 68 IN | SYSTOLIC BLOOD PRESSURE: 118 MMHG

## 2020-01-27 DIAGNOSIS — Z3A.35 PREGNANCY WITH 35 COMPLETED WEEKS GESTATION: Primary | ICD-10-CM

## 2020-01-27 PROCEDURE — PNV: Performed by: OBSTETRICS & GYNECOLOGY

## 2020-01-27 NOTE — PROGRESS NOTES
Patient reports good fm, no n/v, headache, cramping, bleeding, loss of fluid, edema, dom violence, or smoking  melquiades pnv feels can only eat a little bit at a time now    Urine neg/neg return in 1 week for gbs

## 2020-02-03 ENCOUNTER — ROUTINE PRENATAL (OUTPATIENT)
Dept: OBGYN CLINIC | Facility: CLINIC | Age: 35
End: 2020-02-03

## 2020-02-03 VITALS
DIASTOLIC BLOOD PRESSURE: 78 MMHG | SYSTOLIC BLOOD PRESSURE: 112 MMHG | BODY MASS INDEX: 27.25 KG/M2 | HEIGHT: 68 IN | WEIGHT: 179.8 LBS

## 2020-02-03 DIAGNOSIS — Z34.03 ENCOUNTER FOR SUPERVISION OF NORMAL FIRST PREGNANCY IN THIRD TRIMESTER: Primary | ICD-10-CM

## 2020-02-03 PROCEDURE — 87653 STREP B DNA AMP PROBE: CPT | Performed by: PHYSICIAN ASSISTANT

## 2020-02-03 PROCEDURE — PNV: Performed by: PHYSICIAN ASSISTANT

## 2020-02-03 NOTE — PROGRESS NOTES
Pt feels well  No cramping or ctxns, good FM  No vb/lof  No n/v/ha, no edema, no smoking  GBS done  Pt has checking in later this week  Will plan LT at next visit     Urine neg/neg

## 2020-02-05 LAB — GP B STREP DNA SPEC QL NAA+PROBE: NORMAL

## 2020-02-13 ENCOUNTER — ROUTINE PRENATAL (OUTPATIENT)
Dept: OBGYN CLINIC | Facility: CLINIC | Age: 35
End: 2020-02-13

## 2020-02-13 VITALS
WEIGHT: 187.2 LBS | DIASTOLIC BLOOD PRESSURE: 74 MMHG | SYSTOLIC BLOOD PRESSURE: 112 MMHG | HEIGHT: 68 IN | BODY MASS INDEX: 28.37 KG/M2

## 2020-02-13 DIAGNOSIS — Z34.03 ENCOUNTER FOR SUPERVISION OF NORMAL FIRST PREGNANCY IN THIRD TRIMESTER: Primary | ICD-10-CM

## 2020-02-13 PROCEDURE — PNV: Performed by: OBSTETRICS & GYNECOLOGY

## 2020-02-21 ENCOUNTER — ROUTINE PRENATAL (OUTPATIENT)
Dept: OBGYN CLINIC | Facility: CLINIC | Age: 35
End: 2020-02-21

## 2020-02-21 VITALS
SYSTOLIC BLOOD PRESSURE: 104 MMHG | DIASTOLIC BLOOD PRESSURE: 68 MMHG | BODY MASS INDEX: 28.61 KG/M2 | WEIGHT: 188.8 LBS | HEIGHT: 68 IN

## 2020-02-21 DIAGNOSIS — Z3A.38 PREGNANCY WITH 38 COMPLETED WEEKS GESTATION: Primary | ICD-10-CM

## 2020-02-21 PROCEDURE — PNV: Performed by: OBSTETRICS & GYNECOLOGY

## 2020-02-24 ENCOUNTER — HOSPITAL ENCOUNTER (OUTPATIENT)
Facility: HOSPITAL | Age: 35
Discharge: HOME | End: 2020-02-24
Attending: OBSTETRICS & GYNECOLOGY | Admitting: OBSTETRICS & GYNECOLOGY
Payer: COMMERCIAL

## 2020-02-24 ENCOUNTER — TELEPHONE (OUTPATIENT)
Dept: OBGYN CLINIC | Facility: CLINIC | Age: 35
End: 2020-02-24

## 2020-02-24 VITALS
DIASTOLIC BLOOD PRESSURE: 72 MMHG | HEART RATE: 75 BPM | TEMPERATURE: 98.4 F | SYSTOLIC BLOOD PRESSURE: 118 MMHG | RESPIRATION RATE: 18 BRPM

## 2020-02-24 PROCEDURE — NC001 PR NO CHARGE: Performed by: OBSTETRICS & GYNECOLOGY

## 2020-02-24 PROCEDURE — 99213 OFFICE O/P EST LOW 20 MIN: CPT

## 2020-02-24 PROCEDURE — 76815 OB US LIMITED FETUS(S): CPT | Performed by: OBSTETRICS & GYNECOLOGY

## 2020-02-24 NOTE — PROCEDURES
Alexandiebindu Brewer, a  at 44w2d with an [place] of 2020, by Last Menstrual Period, was seen at [address] for the following procedure(s): $Procedure Type: GRIS]    4 Quadrant GRIS  GRIS Q1 (cm): 5 1 cm  GRIS Q2 (cm): 5 1 cm  GRIS Q3 (cm): 5 5 cm  GRIS Q4 (cm): 1 6 cm  GRIS TOTAL (cm): 17 3 cm

## 2020-02-24 NOTE — TELEPHONE ENCOUNTER
Due Saturday and dog pulled her down and fell on hands  Doesn't think hit belly but not sure   Baby is moving

## 2020-02-24 NOTE — PROGRESS NOTES
Triage Note - OB  Radha More 28 y o  female MRN: [id number]  Unit/Bed#: LD TRIAGE 2 Encounter: [id number]    OB TRIAGE NOTE  Radha More  [id number]  2020  3:02 PM  LD TRIAGE 2/LD TRIAGE 2-*    ASSESS:  28 y o   39w2d with extended monitoring following a fall this morning    PLAN  · Extended monitoring, FHT has been reactive with moderate variability   · GRIS: 17 3 cm    Discharge instructions  · Patient instructed to call if experiencing contractions, vaginal bleeding, loss of fluid or decreased fetal movement  · No more dog walking  · Will follow up with OBGYN on 2020    D/w Dr Menendez Books   ______________    SUBJECTIVE    [place]: Estimated Date of Delivery: 20    HPI Chronology:  28 y o   39w2d presents to triage after a fall around 8AM  She states that she was walking her dog when it suddenly took off running and she ws still holding the leash  She was able to catch herself with her hands and does not think that she hit her abdomen but it not entirely sure  She denies contractions, LOF, VB, and has positive fetal movement, though she states that baby is moving less than normal       Vitals:   /72 (BP Location: Right arm)   Pulse 75   Temp 98 4 °F (36 9 °C) (Temporal)   Resp 18   LMP 2019   There is no height or weight on file to calculate BMI     ROS:  See HPI    Physical Exam   Constitutional: She is oriented to person, place, and time  She appears well-developed and well-nourished  No distress  HENT:   Head: Normocephalic and atraumatic  Pulmonary/Chest: Effort normal  No respiratory distress  Abdominal: There is no tenderness  There is no rebound and no guarding  Gravid uterus   Musculoskeletal: She exhibits no edema or tenderness  Neurological: She is alert and oriented to person, place, and time  Skin: Skin is warm and dry  Psychiatric: She has a normal mood and affect   Her behavior is normal  Thought content normal           FHT:  Baseline Rate: 135 bpm  Variability: Moderate 6-25 bpm  Accelerations: 15 x 15 or greater, At variable times     TOCO:   Contraction Frequency (minutes): 0 0(with irriatability)  Contraction Duration (seconds):   Contraction Quality: Mild    Labs: No results found for this or any previous visit (from the past 24 hour(s))  Lab, Imaging and other studies: I have personally reviewed pertinent reports          Allyson Robbins MD  2/24/2020  3:02 PM

## 2020-02-28 ENCOUNTER — ROUTINE PRENATAL (OUTPATIENT)
Dept: OBGYN CLINIC | Facility: CLINIC | Age: 35
End: 2020-02-28

## 2020-02-28 ENCOUNTER — TELEPHONE (OUTPATIENT)
Dept: OBGYN CLINIC | Facility: CLINIC | Age: 35
End: 2020-02-28

## 2020-02-28 ENCOUNTER — HOSPITAL ENCOUNTER (INPATIENT)
Facility: HOSPITAL | Age: 35
LOS: 2 days | Discharge: HOME | End: 2020-03-02
Attending: OBSTETRICS & GYNECOLOGY | Admitting: OBSTETRICS & GYNECOLOGY
Payer: COMMERCIAL

## 2020-02-28 VITALS
WEIGHT: 184.2 LBS | DIASTOLIC BLOOD PRESSURE: 74 MMHG | SYSTOLIC BLOOD PRESSURE: 102 MMHG | BODY MASS INDEX: 27.92 KG/M2 | HEIGHT: 68 IN

## 2020-02-28 DIAGNOSIS — Z34.03 ENCOUNTER FOR SUPERVISION OF NORMAL FIRST PREGNANCY IN THIRD TRIMESTER: Primary | ICD-10-CM

## 2020-02-28 DIAGNOSIS — Z3A.40 40 WEEKS GESTATION OF PREGNANCY: Primary | ICD-10-CM

## 2020-02-28 PROCEDURE — 3008F BODY MASS INDEX DOCD: CPT

## 2020-02-28 PROCEDURE — PNV: Performed by: OBSTETRICS & GYNECOLOGY

## 2020-02-28 PROCEDURE — 10907ZC DRAINAGE OF AMNIOTIC FLUID, THERAPEUTIC FROM PRODUCTS OF CONCEPTION, VIA NATURAL OR ARTIFICIAL OPENING: ICD-10-PCS | Performed by: OBSTETRICS & GYNECOLOGY

## 2020-02-28 NOTE — TELEPHONE ENCOUNTER
Patient is inquiring about scheduling an induction for next week  Dr Sendy Mendenhall said to send task to [facility] for review and that Savanah Barbour would reach back out to patient with options

## 2020-02-29 ENCOUNTER — ANESTHESIA EVENT (INPATIENT)
Dept: ANESTHESIOLOGY | Facility: HOSPITAL | Age: 35
End: 2020-02-29
Payer: COMMERCIAL

## 2020-02-29 ENCOUNTER — ANESTHESIA (INPATIENT)
Dept: ANESTHESIOLOGY | Facility: HOSPITAL | Age: 35
End: 2020-02-29
Payer: COMMERCIAL

## 2020-02-29 PROBLEM — Z3A.40 40 WEEKS GESTATION OF PREGNANCY: Status: ACTIVE | Noted: 2020-02-29

## 2020-02-29 LAB
ABO GROUP BLD: NORMAL
AMPHETAMINES SERPL QL SCN: NEGATIVE
BARBITURATES UR QL: NEGATIVE
BASE EXCESS BLDCOA CALC-SCNC: -2.3 MMOL/L (ref 3–11)
BASE EXCESS BLDCOV CALC-SCNC: -4.2 MMOL/L (ref 1–9)
BENZODIAZ UR QL: NEGATIVE
BLD GP AB SCN SERPL QL: NEGATIVE
COCAINE UR QL: NEGATIVE
ERYTHROCYTE [DISTWIDTH] IN BLOOD BY AUTOMATED COUNT: 13.3 % (ref 11.6–15.1)
HCO3 BLDCOA-SCNC: 25.1 MMOL/L (ref 17.3–27.3)
HCO3 BLDCOV-SCNC: 21.2 MMOL/L (ref 12.2–28.6)
HCT VFR BLD AUTO: 34.4 % (ref 34.8–46.1)
HGB BLD-MCNC: 11.4 G/DL (ref 11.5–15.4)
MCH RBC QN AUTO: 30.2 PG (ref 26.8–34.3)
MCHC RBC AUTO-ENTMCNC: 33.1 G/DL (ref 31.4–37.4)
MCV RBC AUTO: 91 FL (ref 82–98)
METHADONE UR QL: NEGATIVE
O2 CT VFR BLDCOA CALC: 10.2 ML/DL
OPIATES UR QL SCN: NEGATIVE
OXYHGB MFR BLDCOA: 47.4 %
OXYHGB MFR BLDCOV: 70.8 %
PCO2 BLDCOA: 53.3 MM[HG] (ref 30–60)
PCO2 BLDCOV: 40.3 MM HG (ref 27–43)
PCP UR QL: NEGATIVE
PH BLDCOA: 7.29 [PH] (ref 7.23–7.43)
PH BLDCOV: 7.34 [PH] (ref 7.19–7.49)
PLATELET # BLD AUTO: 168 THOUSANDS/UL (ref 149–390)
PMV BLD AUTO: 11.7 FL (ref 8.9–12.7)
PO2 BLDCOA: 22 MM HG (ref 5–25)
PO2 BLDCOV: 30.5 MM HG (ref 15–45)
RBC # BLD AUTO: 3.77 MILLION/UL (ref 3.81–5.12)
RH BLD: POSITIVE
SAO2 % BLDCOV: 15.4 ML/DL
SPECIMEN EXPIRATION DATE: NORMAL
THC UR QL: NEGATIVE
WBC # BLD AUTO: 14.46 THOUSAND/UL (ref 4.31–10.16)

## 2020-02-29 PROCEDURE — 86850 RBC ANTIBODY SCREEN: CPT | Performed by: OBSTETRICS & GYNECOLOGY

## 2020-02-29 PROCEDURE — 99024 POSTOP FOLLOW-UP VISIT: CPT | Performed by: OBSTETRICS & GYNECOLOGY

## 2020-02-29 PROCEDURE — 86592 SYPHILIS TEST NON-TREP QUAL: CPT | Performed by: OBSTETRICS & GYNECOLOGY

## 2020-02-29 PROCEDURE — 85027 COMPLETE CBC AUTOMATED: CPT | Performed by: OBSTETRICS & GYNECOLOGY

## 2020-02-29 PROCEDURE — 86901 BLOOD TYPING SEROLOGIC RH(D): CPT | Performed by: OBSTETRICS & GYNECOLOGY

## 2020-02-29 PROCEDURE — 82805 BLOOD GASES W/O2 SATURATION: CPT | Performed by: OBSTETRICS & GYNECOLOGY

## 2020-02-29 PROCEDURE — 86900 BLOOD TYPING SEROLOGIC ABO: CPT | Performed by: OBSTETRICS & GYNECOLOGY

## 2020-02-29 PROCEDURE — 99213 OFFICE O/P EST LOW 20 MIN: CPT

## 2020-02-29 PROCEDURE — 59400 OBSTETRICAL CARE: CPT | Performed by: OBSTETRICS & GYNECOLOGY

## 2020-02-29 PROCEDURE — 80307 DRUG TEST PRSMV CHEM ANLYZR: CPT | Performed by: OBSTETRICS & GYNECOLOGY

## 2020-02-29 PROCEDURE — 0KQM0ZZ REPAIR PERINEUM MUSCLE, OPEN APPROACH: ICD-10-PCS | Performed by: OBSTETRICS & GYNECOLOGY

## 2020-02-29 RX ORDER — OXYTOCIN/RINGER'S LACTATE 30/500 ML
PLASTIC BAG, INJECTION (ML) INTRAVENOUS
Status: COMPLETED
Start: 2020-02-29 | End: 2020-02-29

## 2020-02-29 RX ORDER — METHYLERGONOVINE MALEATE 0.2 MG/ML
INJECTION INTRAVENOUS
Status: COMPLETED
Start: 2020-02-29 | End: 2020-02-29

## 2020-02-29 RX ORDER — DIAPER,BRIEF,INFANT-TODD,DISP
1 EACH MISCELLANEOUS AS NEEDED
Status: DISCONTINUED | OUTPATIENT
Start: 2020-02-29 | End: 2020-03-02 | Stop reason: HOSPADM

## 2020-02-29 RX ORDER — CARBOPROST TROMETHAMINE 250 UG/ML
INJECTION, SOLUTION INTRAMUSCULAR
Status: DISCONTINUED
Start: 2020-02-29 | End: 2020-02-29 | Stop reason: WASHOUT

## 2020-02-29 RX ORDER — METHYLERGONOVINE MALEATE 0.2 MG/ML
0.2 INJECTION INTRAVENOUS ONCE
Status: COMPLETED | OUTPATIENT
Start: 2020-02-29 | End: 2020-02-29

## 2020-02-29 RX ORDER — SODIUM CHLORIDE, SODIUM LACTATE, POTASSIUM CHLORIDE, CALCIUM CHLORIDE 600; 310; 30; 20 MG/100ML; MG/100ML; MG/100ML; MG/100ML
125 INJECTION, SOLUTION INTRAVENOUS CONTINUOUS
Status: DISCONTINUED | OUTPATIENT
Start: 2020-02-29 | End: 2020-02-29

## 2020-02-29 RX ORDER — OXYTOCIN/RINGER'S LACTATE 30/500 ML
1-30 PLASTIC BAG, INJECTION (ML) INTRAVENOUS
Status: DISCONTINUED | OUTPATIENT
Start: 2020-02-29 | End: 2020-02-29

## 2020-02-29 RX ORDER — ACETAMINOPHEN 325 MG/1
650 TABLET ORAL EVERY 6 HOURS PRN
Status: DISCONTINUED | OUTPATIENT
Start: 2020-02-29 | End: 2020-03-02 | Stop reason: HOSPADM

## 2020-02-29 RX ORDER — ROPIVACAINE HYDROCHLORIDE 2 MG/ML
INJECTION, SOLUTION EPIDURAL; INFILTRATION; PERINEURAL
Status: COMPLETED | OUTPATIENT
Start: 2020-02-29 | End: 2020-02-29

## 2020-02-29 RX ORDER — IBUPROFEN 600 MG/1
600 TABLET ORAL EVERY 6 HOURS PRN
Status: DISCONTINUED | OUTPATIENT
Start: 2020-02-29 | End: 2020-03-02 | Stop reason: HOSPADM

## 2020-02-29 RX ORDER — ONDANSETRON 2 MG/ML
4 INJECTION INTRAMUSCULAR; INTRAVENOUS EVERY 6 HOURS PRN
Status: DISCONTINUED | OUTPATIENT
Start: 2020-02-29 | End: 2020-02-29

## 2020-02-29 RX ORDER — SIMETHICONE 80 MG
80 TABLET,CHEWABLE ORAL 4 TIMES DAILY PRN
Status: DISCONTINUED | OUTPATIENT
Start: 2020-02-29 | End: 2020-03-02 | Stop reason: HOSPADM

## 2020-02-29 RX ORDER — CALCIUM CARBONATE 200(500)MG
1000 TABLET,CHEWABLE ORAL DAILY PRN
Status: DISCONTINUED | OUTPATIENT
Start: 2020-02-29 | End: 2020-03-02 | Stop reason: HOSPADM

## 2020-02-29 RX ORDER — EPHEDRINE SULFATE 50 MG/ML
INJECTION INTRAVENOUS AS NEEDED
Status: DISCONTINUED | OUTPATIENT
Start: 2020-02-29 | End: 2020-02-29 | Stop reason: SURG

## 2020-02-29 RX ORDER — PANTOPRAZOLE SODIUM 20 MG/1
20 TABLET, DELAYED RELEASE ORAL
Status: DISCONTINUED | OUTPATIENT
Start: 2020-02-29 | End: 2020-03-02 | Stop reason: HOSPADM

## 2020-02-29 RX ORDER — LIDOCAINE HYDROCHLORIDE AND EPINEPHRINE 15; 5 MG/ML; UG/ML
INJECTION, SOLUTION EPIDURAL
Status: COMPLETED | OUTPATIENT
Start: 2020-02-29 | End: 2020-02-29

## 2020-02-29 RX ORDER — BUTORPHANOL TARTRATE 1 MG/ML
1 INJECTION, SOLUTION INTRAMUSCULAR; INTRAVENOUS ONCE
Status: DISCONTINUED | OUTPATIENT
Start: 2020-02-29 | End: 2020-02-29

## 2020-02-29 RX ORDER — BUPIVACAINE HYDROCHLORIDE 2.5 MG/ML
INJECTION, SOLUTION EPIDURAL; INFILTRATION; INTRACAUDAL AS NEEDED
Status: DISCONTINUED | OUTPATIENT
Start: 2020-02-29 | End: 2020-02-29 | Stop reason: SURG

## 2020-02-29 RX ORDER — DOCUSATE SODIUM 100 MG/1
100 CAPSULE, LIQUID FILLED ORAL 2 TIMES DAILY
Status: DISCONTINUED | OUTPATIENT
Start: 2020-02-29 | End: 2020-03-02 | Stop reason: HOSPADM

## 2020-02-29 RX ADMIN — Medication 2 MILLI-UNITS/MIN: at 10:48

## 2020-02-29 RX ADMIN — SODIUM CHLORIDE, SODIUM LACTATE, POTASSIUM CHLORIDE, AND CALCIUM CHLORIDE 999 ML/HR: .6; .31; .03; .02 INJECTION, SOLUTION INTRAVENOUS at 12:53

## 2020-02-29 RX ADMIN — ROPIVACAINE HYDROCHLORIDE: 2 INJECTION, SOLUTION EPIDURAL; INFILTRATION at 17:19

## 2020-02-29 RX ADMIN — SODIUM CHLORIDE, SODIUM LACTATE, POTASSIUM CHLORIDE, AND CALCIUM CHLORIDE 125 ML/HR: .6; .31; .03; .02 INJECTION, SOLUTION INTRAVENOUS at 16:10

## 2020-02-29 RX ADMIN — Medication 1 APPLICATION: at 21:47

## 2020-02-29 RX ADMIN — BUPIVACAINE HYDROCHLORIDE 5 ML: 2.5 INJECTION, SOLUTION EPIDURAL; INFILTRATION; INTRACAUDAL at 13:12

## 2020-02-29 RX ADMIN — PANTOPRAZOLE SODIUM 20 MG: 20 TABLET, DELAYED RELEASE ORAL at 13:49

## 2020-02-29 RX ADMIN — WITCH HAZEL 1 PAD: 500 SOLUTION RECTAL; TOPICAL at 21:47

## 2020-02-29 RX ADMIN — DOCUSATE SODIUM 100 MG: 100 CAPSULE, LIQUID FILLED ORAL at 21:48

## 2020-02-29 RX ADMIN — ONDANSETRON 4 MG: 2 INJECTION INTRAMUSCULAR; INTRAVENOUS at 14:50

## 2020-02-29 RX ADMIN — SODIUM CHLORIDE, SODIUM LACTATE, POTASSIUM CHLORIDE, AND CALCIUM CHLORIDE 125 ML/HR: .6; .31; .03; .02 INJECTION, SOLUTION INTRAVENOUS at 05:53

## 2020-02-29 RX ADMIN — ROPIVACAINE HYDROCHLORIDE 6 ML: 2 INJECTION, SOLUTION EPIDURAL; INFILTRATION at 05:40

## 2020-02-29 RX ADMIN — EPHEDRINE SULFATE 10 MG: 50 INJECTION, SOLUTION INTRAVENOUS at 14:41

## 2020-02-29 RX ADMIN — METHYLERGONOVINE MALEATE 0.2 MG: 0.2 INJECTION INTRAVENOUS at 19:29

## 2020-02-29 RX ADMIN — BUPIVACAINE HYDROCHLORIDE 5 ML: 2.5 INJECTION, SOLUTION EPIDURAL; INFILTRATION; INTRACAUDAL at 13:05

## 2020-02-29 RX ADMIN — ROPIVACAINE HYDROCHLORIDE: 2 INJECTION, SOLUTION EPIDURAL; INFILTRATION at 05:50

## 2020-02-29 RX ADMIN — LIDOCAINE HYDROCHLORIDE AND EPINEPHRINE 5 ML: 15; 5 INJECTION, SOLUTION EPIDURAL at 05:40

## 2020-02-29 RX ADMIN — HYDROCORTISONE 1 APPLICATION: 1 CREAM TOPICAL at 21:47

## 2020-02-29 RX ADMIN — SODIUM CHLORIDE, SODIUM LACTATE, POTASSIUM CHLORIDE, AND CALCIUM CHLORIDE 999 ML/HR: .6; .31; .03; .02 INJECTION, SOLUTION INTRAVENOUS at 04:50

## 2020-02-29 RX ADMIN — ROPIVACAINE HYDROCHLORIDE: 2 INJECTION, SOLUTION EPIDURAL; INFILTRATION at 11:55

## 2020-02-29 RX ADMIN — IBUPROFEN 600 MG: 600 TABLET ORAL at 21:48

## 2020-02-29 NOTE — PLAN OF CARE
Problem: Potential for Falls  Goal: Patient will remain free of falls  Description  INTERVENTIONS:  - Assess patient frequently for physical needs  -  Identify cognitive and physical deficits and behaviors that affect risk of falls  -  [place] fall precautions as indicated by assessment   - Educate patient/family on patient safety including physical limitations  - Instruct patient to call for assistance with activity based on assessment  - Modify environment to reduce risk of injury  - Consider OT/PT consult to assist with strengthening/mobility  Outcome: Progressing     Problem: Knowledge Deficit  Goal: Verbalizes understanding of labor plan  Description  Assess patient/family/caregiver's baseline knowledge level and ability to understand information  Provide education via patient/family/caregiver's preferred learning method at appropriate level of understanding  1  Provide teaching at level of understanding  2  Provide teaching via preferred learning method(s)  Outcome: Progressing  Goal: Patient/family/caregiver demonstrates understanding of disease process, treatment plan, medications, and discharge instructions  Description  Complete learning assessment and assess knowledge base  Interventions:  - Provide teaching at level of understanding  - Provide teaching via preferred learning methods  Outcome: Progressing     Problem: Labor & Delivery  Goal: Manages discomfort  Description  Assess and monitor for signs and symptoms of discomfort  Assess patient's pain level regularly and per hospital policy  Administer medications as ordered  Support use of nonpharmacological methods to help control pain such as distraction, imagery, relaxation, and application of heat and cold  Collaborate with interdisciplinary team and patient to determine appropriate pain management plan  1  Include patient in decisions related to comfort  2  Offer non-pharmacological pain management interventions    3  Report ineffective pain management to physician  Outcome: Progressing  Goal: Patient vital signs are stable  Description  1  Assess vital signs - vaginal delivery    Outcome: Progressing     Problem: BIRTH - VAGINAL/ SECTION  Goal: Fetal and maternal status remain reassuring during the birth process  Description  INTERVENTIONS:  - Monitor vital signs  - Monitor fetal heart rate  - Monitor uterine activity  - Monitor labor progression (vaginal delivery)  - DVT prophylaxis  - Antibiotic prophylaxis  Outcome: Progressing  Goal: Emotionally satisfying birthing experience for mother/fetus  Description  Interventions:  - Assess, plan, implement and evaluate the nursing care given to the patient in labor  - Advocate the philosophy that each childbirth experience is a unique experience and support the family's chosen level of involvement and control during the labor process   - Actively participate in both the patient's and family's teaching of the birth process  - Consider cultural, Sabianism and age-specific factors and plan care for the patient in labor  Outcome: Progressing     Problem: PAIN - ADULT  Goal: Verbalizes/displays adequate comfort level or baseline comfort level  Description  Interventions:  - Encourage patient to monitor pain and request assistance  - Assess pain using appropriate pain scale  - Administer analgesics based on type and severity of pain and evaluate response  - Implement non-pharmacological measures as appropriate and evaluate response  - Consider cultural and social influences on pain and pain management  - Notify physician/advanced practitioner if interventions unsuccessful or patient reports new pain  Outcome: Progressing     Problem: INFECTION - ADULT  Goal: Absence or prevention of progression during hospitalization  Description  INTERVENTIONS:  - Assess and monitor for signs and symptoms of infection  - Monitor lab/diagnostic results  - Monitor all insertion sites, i e  indwelling lines, tubes, and drains  - Monitor endotracheal if appropriate and nasal secretions for changes in amount and color  - [place] appropriate cooling/warming therapies per order  - Administer medications as ordered  - Instruct and encourage patient and family to use good hand hygiene technique  - Identify and instruct in appropriate isolation precautions for identified infection/condition  Outcome: Progressing  Goal: Absence of fever/infection during neutropenic period  Description  INTERVENTIONS:  - Monitor WBC    Outcome: Progressing     Problem: SAFETY ADULT  Goal: Maintain or return to baseline ADL function  Description  INTERVENTIONS:  -  Assess patient's ability to carry out ADLs; assess patient's baseline for ADL function and identify physical deficits which impact ability to perform ADLs (bathing, care of mouth/teeth, toileting, grooming, dressing, etc )  - Assess/evaluate cause of self-care deficits   - Assess range of motion  - Assess patient's mobility; develop plan if impaired  - Assess patient's need for assistive devices and provide as appropriate  - Encourage maximum independence but intervene and supervise when necessary  - Involve family in performance of ADLs  - Assess for home care needs following discharge   - Consider OT consult to assist with ADL evaluation and planning for discharge  - Provide patient education as appropriate  Outcome: Progressing  Goal: Maintain or return mobility status to optimal level  Description  INTERVENTIONS:  - Assess patient's baseline mobility status (ambulation, transfers, stairs, etc )    - Identify cognitive and physical deficits and behaviors that affect mobility  - Identify mobility aids required to assist with transfers and/or ambulation (gait belt, sit-to-stand, lift, walker, cane, etc )  - [place] fall precautions as indicated by assessment  - Record patient progress and toleration of activity level on Mobility SBAR; progress patient to next Phase/Stage  - Instruct patient to call for assistance with activity based on assessment  - Consider rehabilitation consult to assist with strengthening/weightbearing, etc   Outcome: Progressing     Problem: DISCHARGE PLANNING  Goal: Discharge to home or other facility with appropriate resources  Description  INTERVENTIONS:  - Identify barriers to discharge w/patient and caregiver  - Arrange for needed discharge resources and transportation as appropriate  - Identify discharge learning needs (meds, wound care, etc )  - Arrange for interpretive services to assist at discharge as needed  - Refer to Case Management Department for coordinating discharge planning if the patient needs post-hospital services based on physician/advanced practitioner order or complex needs related to functional status, cognitive ability, or social support system  Outcome: Progressing

## 2020-02-29 NOTE — PLAN OF CARE
Problem: Potential for Falls  Goal: Patient will remain free of falls  Description  INTERVENTIONS:  - Assess patient frequently for physical needs  -  Identify cognitive and physical deficits and behaviors that affect risk of falls  -  [place] fall precautions as indicated by assessment   - Educate patient/family on patient safety including physical limitations  - Instruct patient to call for assistance with activity based on assessment  - Modify environment to reduce risk of injury  - Consider OT/PT consult to assist with strengthening/mobility  Outcome: Progressing     Problem: Knowledge Deficit  Goal: Verbalizes understanding of labor plan  Description  Assess patient/family/caregiver's baseline knowledge level and ability to understand information  Provide education via patient/family/caregiver's preferred learning method at appropriate level of understanding  1  Provide teaching at level of understanding  2  Provide teaching via preferred learning method(s)  Outcome: Progressing  Goal: Patient/family/caregiver demonstrates understanding of disease process, treatment plan, medications, and discharge instructions  Description  Complete learning assessment and assess knowledge base  Interventions:  - Provide teaching at level of understanding  - Provide teaching via preferred learning methods  Outcome: Progressing     Problem: Labor & Delivery  Goal: Manages discomfort  Description  Assess and monitor for signs and symptoms of discomfort  Assess patient's pain level regularly and per hospital policy  Administer medications as ordered  Support use of nonpharmacological methods to help control pain such as distraction, imagery, relaxation, and application of heat and cold  Collaborate with interdisciplinary team and patient to determine appropriate pain management plan  1  Include patient in decisions related to comfort  2  Offer non-pharmacological pain management interventions    3  Report ineffective pain management to physician  Outcome: Progressing  Goal: Patient vital signs are stable  Description  1  Assess vital signs - vaginal delivery    Outcome: Progressing     Problem: BIRTH - VAGINAL/ SECTION  Goal: Fetal and maternal status remain reassuring during the birth process  Description  INTERVENTIONS:  - Monitor vital signs  - Monitor fetal heart rate  - Monitor uterine activity  - Monitor labor progression (vaginal delivery)  - DVT prophylaxis  - Antibiotic prophylaxis  Outcome: Progressing  Goal: Emotionally satisfying birthing experience for mother/fetus  Description  Interventions:  - Assess, plan, implement and evaluate the nursing care given to the patient in labor  - Advocate the philosophy that each childbirth experience is a unique experience and support the family's chosen level of involvement and control during the labor process   - Actively participate in both the patient's and family's teaching of the birth process  - Consider cultural, [place] and age-specific factors and plan care for the patient in labor  Outcome: Progressing     Problem: PAIN - ADULT  Goal: Verbalizes/displays adequate comfort level or baseline comfort level  Description  Interventions:  - Encourage patient to monitor pain and request assistance  - Assess pain using appropriate pain scale  - Administer analgesics based on type and severity of pain and evaluate response  - Implement non-pharmacological measures as appropriate and evaluate response  - Consider cultural and social influences on pain and pain management  - Notify physician/advanced practitioner if interventions unsuccessful or patient reports new pain  Outcome: Progressing     Problem: INFECTION - ADULT  Goal: Absence or prevention of progression during hospitalization  Description  INTERVENTIONS:  - Assess and monitor for signs and symptoms of infection  - Monitor lab/diagnostic results  - Monitor all insertion sites, i e  indwelling lines, tubes, and drains  - Monitor endotracheal if appropriate and nasal secretions for changes in amount and color  - [place] appropriate cooling/warming therapies per order  - Administer medications as ordered  - Instruct and encourage patient and family to use good hand hygiene technique  - Identify and instruct in appropriate isolation precautions for identified infection/condition  Outcome: Progressing  Goal: Absence of fever/infection during neutropenic period  Description  INTERVENTIONS:  - Monitor WBC    Outcome: Progressing     Problem: SAFETY ADULT  Goal: Maintain or return to baseline ADL function  Description  INTERVENTIONS:  -  Assess patient's ability to carry out ADLs; assess patient's baseline for ADL function and identify physical deficits which impact ability to perform ADLs (bathing, care of mouth/teeth, toileting, grooming, dressing, etc )  - Assess/evaluate cause of self-care deficits   - Assess range of motion  - Assess patient's mobility; develop plan if impaired  - Assess patient's need for assistive devices and provide as appropriate  - Encourage maximum independence but intervene and supervise when necessary  - Involve family in performance of ADLs  - Assess for home care needs following discharge   - Consider OT consult to assist with ADL evaluation and planning for discharge  - Provide patient education as appropriate  Outcome: Progressing  Goal: Maintain or return mobility status to optimal level  Description  INTERVENTIONS:  - Assess patient's baseline mobility status (ambulation, transfers, stairs, etc )    - Identify cognitive and physical deficits and behaviors that affect mobility  - Identify mobility aids required to assist with transfers and/or ambulation (gait belt, sit-to-stand, lift, walker, cane, etc )  - [place] fall precautions as indicated by assessment  - Record patient progress and toleration of activity level on Mobility SBAR; progress patient to next Phase/Stage  - Instruct patient to call for assistance with activity based on assessment  - Consider rehabilitation consult to assist with strengthening/weightbearing, etc   Outcome: Progressing     Problem: DISCHARGE PLANNING  Goal: Discharge to home or other facility with appropriate resources  Description  INTERVENTIONS:  - Identify barriers to discharge w/patient and caregiver  - Arrange for needed discharge resources and transportation as appropriate  - Identify discharge learning needs (meds, wound care, etc )  - Arrange for interpretive services to assist at discharge as needed  - Refer to Case Management Department for coordinating discharge planning if the patient needs post-hospital services based on physician/advanced practitioner order or complex needs related to functional status, cognitive ability, or social support system  Outcome: Progressing

## 2020-02-29 NOTE — OB LABOR/OXYTOCIN SAFETY PROGRESS
Oxytocin Safety Progress Check Note - Didi Jenkins Stellate 28 y o  female MRN: [id number]    Unit/Bed#: -01 Encounter: [id number]    Dose (julieta-units/min) Oxytocin: 14 julieta-units/min  Contraction Frequency (minutes): 3-4  Contraction Quality: Moderate  Tachysystole: No   Dilation: 8-9        Effacement (%): 90  Station: 0  Baseline Rate: 140 bpm  Fetal Heart Rate: 143 BPM  FHR Category: Category I             Notes/comments:   Patient comfortable but feeling too numb  Reviewed and discussed  Continue to titrate oxytocin as needed and monitor for labor progress      Keila Molina MD 2/29/2020 2:27 PM

## 2020-02-29 NOTE — OB LABOR/OXYTOCIN SAFETY PROGRESS
Oxytocin Safety Progress Check Note - Ted More 28 y o  female MRN: [id number]    Unit/Bed#: -01 Encounter: [id number]       Contraction Frequency (minutes): 6-7  Contraction Quality: Moderate  Tachysystole: No   Dilation: 6        Effacement (%): 90  Station: 0  Baseline Rate: 135 bpm  Fetal Heart Rate: 155 BPM  FHR Category: Category I             Notes/comments:   Patient comfortable with epidural   arom clear    Will continue to monitor for progress    Lara Cardenas MD 2/29/2020 7:54 AM

## 2020-02-29 NOTE — PLAN OF CARE
Problem: Potential for Falls  Goal: Patient will remain free of falls  Description  INTERVENTIONS:  - Assess patient frequently for physical needs  -  Identify cognitive and physical deficits and behaviors that affect risk of falls  -  [place] fall precautions as indicated by assessment   - Educate patient/family on patient safety including physical limitations  - Instruct patient to call for assistance with activity based on assessment  - Modify environment to reduce risk of injury  - Consider OT/PT consult to assist with strengthening/mobility  2/29/2020 0741 by Claude Decent, RN  Outcome: Progressing  2/29/2020 0741 by Claude Decent, RN  Outcome: Progressing     Problem: Knowledge Deficit  Goal: Verbalizes understanding of labor plan  Description  Assess patient/family/caregiver's baseline knowledge level and ability to understand information  Provide education via patient/family/caregiver's preferred learning method at appropriate level of understanding  1  Provide teaching at level of understanding  2  Provide teaching via preferred learning method(s)   2/29/2020 0741 by Claude Decent, RN  Outcome: Progressing  2/29/2020 0741 by Claude Decent, RN  Outcome: Progressing  Goal: Patient/family/caregiver demonstrates understanding of disease process, treatment plan, medications, and discharge instructions  Description  Complete learning assessment and assess knowledge base  Interventions:  - Provide teaching at level of understanding  - Provide teaching via preferred learning methods  2/29/2020 0741 by Claude Decent, RN  Outcome: Progressing  2/29/2020 0741 by Claude Decent, RN  Outcome: Progressing     Problem: Labor & Delivery  Goal: Manages discomfort  Description  Assess and monitor for signs and symptoms of discomfort  Assess patient's pain level regularly and per hospital policy  Administer medications as ordered   Support use of nonpharmacological methods to help control pain such as distraction, imagery, relaxation, and application of heat and cold  Collaborate with interdisciplinary team and patient to determine appropriate pain management plan  1  Include patient in decisions related to comfort  2  Offer non-pharmacological pain management interventions  3  Report ineffective pain management to physician   2020 by Jignesh Bai RN  Outcome: Progressing  2020 by Jignesh Bai RN  Outcome: Progressing  Goal: Patient vital signs are stable  Description  1  Assess vital signs - vaginal delivery    2020 by Jignesh Bai RN  Outcome: Progressing  2020 by Jignesh Bai RN  Outcome: Progressing     Problem: BIRTH - VAGINAL/ SECTION  Goal: Fetal and maternal status remain reassuring during the birth process  Description  INTERVENTIONS:  - Monitor vital signs  - Monitor fetal heart rate  - Monitor uterine activity  - Monitor labor progression (vaginal delivery)  - DVT prophylaxis  - Antibiotic prophylaxis  2020 by Jignesh Bai RN  Outcome: Progressing  2020 by Jignesh Bai RN  Outcome: Progressing  Goal: Emotionally satisfying birthing experience for mother/fetus  Description  Interventions:  - Assess, plan, implement and evaluate the nursing care given to the patient in labor  - Advocate the philosophy that each childbirth experience is a unique experience and support the family's chosen level of involvement and control during the labor process   - Actively participate in both the patient's and family's teaching of the birth process  - Consider cultural, [place] and age-specific factors and plan care for the patient in labor  2020 [address] [place] [address] by Jignesh Bai RN  Outcome: Progressing  2020 by Jignesh Bai RN  Outcome: Progressing     Problem: PAIN - ADULT  Goal: Verbalizes/displays adequate comfort level or baseline comfort level  Description  Interventions:  - Encourage patient to monitor pain and request assistance  - Assess pain using appropriate pain scale  - Administer analgesics based on type and severity of pain and evaluate response  - Implement non-pharmacological measures as appropriate and evaluate response  - Consider cultural and social influences on pain and pain management  - Notify physician/advanced practitioner if interventions unsuccessful or patient reports new pain  2/29/2020 0741 by Kehinde Chowdhury RN  Outcome: Progressing  2/29/2020 0741 by Kehinde Chowdhury RN  Outcome: Progressing     Problem: INFECTION - ADULT  Goal: Absence or prevention of progression during hospitalization  Description  INTERVENTIONS:  - Assess and monitor for signs and symptoms of infection  - Monitor lab/diagnostic results  - Monitor all insertion sites, i e  indwelling lines, tubes, and drains  - Monitor endotracheal if appropriate and nasal secretions for changes in amount and color  - [place] appropriate cooling/warming therapies per order  - Administer medications as ordered  - Instruct and encourage patient and family to use good hand hygiene technique  - Identify and instruct in appropriate isolation precautions for identified infection/condition  2/29/2020 0741 by Kehinde Chowdhury RN  Outcome: Progressing  2/29/2020 0741 by Kehinde Chowdhury RN  Outcome: Progressing  Goal: Absence of fever/infection during neutropenic period  Description  INTERVENTIONS:  - Monitor WBC    2/29/2020 0741 by Kehinde Chowdhury RN  Outcome: Progressing  2/29/2020 0741 by Kehinde Chowdhury RN  Outcome: Progressing     Problem: SAFETY ADULT  Goal: Maintain or return to baseline ADL function  Description  INTERVENTIONS:  -  Assess patient's ability to carry out ADLs; assess patient's baseline for ADL function and identify physical deficits which impact ability to perform ADLs (bathing, care of mouth/teeth, toileting, grooming, dressing, etc )  - Assess/evaluate cause of self-care deficits   - Assess range of motion  - Assess patient's mobility; develop plan if impaired  - Assess patient's need for assistive devices and provide as appropriate  - Encourage maximum independence but intervene and supervise when necessary  - Involve family in performance of ADLs  - Assess for home care needs following discharge   - Consider OT consult to assist with ADL evaluation and planning for discharge  - Provide patient education as appropriate  2/29/2020 0741 by Debroah Cushing, RN  Outcome: Progressing  2/29/2020 0741 by Debroah Cushing, RN  Outcome: Progressing  Goal: Maintain or return mobility status to optimal level  Description  INTERVENTIONS:  - Assess patient's baseline mobility status (ambulation, transfers, stairs, etc )    - Identify cognitive and physical deficits and behaviors that affect mobility  - Identify mobility aids required to assist with transfers and/or ambulation (gait belt, sit-to-stand, lift, walker, cane, etc )  - [place] fall precautions as indicated by assessment  - Record patient progress and toleration of activity level on Mobility SBAR; progress patient to next Phase/Stage  - Instruct patient to call for assistance with activity based on assessment  - Consider rehabilitation consult to assist with strengthening/weightbearing, etc   2/29/2020 0741 by Debroah Cushing, RN  Outcome: Progressing  2/29/2020 0741 by Debroah Cushing, RN  Outcome: Progressing     Problem: DISCHARGE PLANNING  Goal: Discharge to home or other facility with appropriate resources  Description  INTERVENTIONS:  - Identify barriers to discharge w/patient and caregiver  - Arrange for needed discharge resources and transportation as appropriate  - Identify discharge learning needs (meds, wound care, etc )  - Arrange for interpretive services to assist at discharge as needed  - Refer to Case Management Department for coordinating discharge planning if the patient needs post-hospital services based on physician/advanced practitioner order or complex needs related to functional status, cognitive ability, or social support system  2/29/2020 0741 by Mikey Cerrato RN  Outcome: Progressing  2/29/2020 0741 by Mikey Cerrato, RN  Outcome: Progressing

## 2020-02-29 NOTE — OB LABOR/OXYTOCIN SAFETY PROGRESS
Labor Progress Note - Stephan More 28 y o  female MRN: [id number]    Unit/Bed#:  205-01 Encounter: [id number]       Contraction Frequency (minutes): 5-6  Contraction Quality: Mild  Tachysystole: No   Dilation: 6        Effacement (%): 90  Station: 0  Baseline Rate: 145 bpm  Fetal Heart Rate: 165 BPM  FHR Category: Category I             Notes/comments:   Pt comfortable with epidural    SVE unchanged at 6/90/0  FHT category I  Will start pitocin for labor augmentation  Consent signed  D/w Dr Britney Ramirez MD 2/29/2020 10:42 AM

## 2020-02-29 NOTE — ANESTHESIA PROCEDURE NOTES
Epidural Block    Patient location during procedure: OB  Start time: 2/29/2020 5:38 AM  Reason for block: procedure for pain and at surgeon's request  Staffing  Anesthesiologist: Bailee Harrington MD  Resident/CRNA: Leslie Carmichael CRNA  Performed: anesthesiologist and resident/CRNA   Preanesthetic Checklist  Completed: patient identified, site marked, surgical consent, pre-op evaluation, timeout performed, IV checked, risks and benefits discussed and monitors and equipment checked  Epidural  Patient position: sitting  Prep: ChloraPrep and site prepped and draped  Patient monitoring: frequent blood pressure checks and continuous pulse ox  Approach: midline  Location: lumbar (1-5)  Injection technique: YOU air  Needle  Needle type: Tuohy   Needle gauge: 18 G  Catheter type: side hole  Catheter size: 20 G  Catheter at skin depth: 11 cm  Test dose: negativelidocaine 1 5% with epinephrine 1:200,000 test dose, 5 mL  ropivacaine (NAROPIN) 0 2% epidural injection, 6 mL  Assessment  Sensory level: [id number] aspiration for CSF, negative aspiration for heme and no paresthesia on injection  patient tolerated the procedure well with no immediate complications

## 2020-02-29 NOTE — ANESTHESIA PREPROCEDURE EVALUATION
Review of Systems/Medical History  Patient summary reviewed        Cardiovascular  Hyperlipidemia,    Pulmonary  Negative pulmonary ROS        GI/Hepatic  Negative GI/hepatic ROS          Negative  ROS        Endo/Other  Negative endo/other ROS      GYN  Negative gynecology ROS          Hematology  Negative hematology ROS      Musculoskeletal  Negative musculoskeletal ROS        Neurology  Negative neurology ROS      Psychology   Negative psychology ROS              Physical Exam    Airway    Mallampati score: I  TM Distance: >3 FB  Neck ROM: full     Dental       Cardiovascular  Cardiovascular exam normal    Pulmonary  Pulmonary exam normal     Other Findings        Anesthesia Plan  ASA Score- 2     Anesthesia Type- epidural with ASA Monitors  Additional Monitors:   Airway Plan:         Plan Factors-    Induction- intravenous  Postoperative Plan-     Informed Consent- Anesthetic plan and risks discussed with patient  I personally reviewed this patient with the CRNA  Discussed and agreed on the Anesthesia Plan with the CRNA  Sonia Coleman

## 2020-02-29 NOTE — OB LABOR/OXYTOCIN SAFETY PROGRESS
Labor Progress Note - Ted More 28 y o  female MRN: [id number]    Unit/Bed#: -01 Encounter: [id number]       Contraction Frequency (minutes): 2-4  Contraction Quality: Mild, Moderate  Tachysystole: No   Dilation: 4        Effacement (%): 70  Station: -2  Baseline Rate: 125 bpm  Fetal Heart Rate: 128 BPM  FHR Category: Category I             Notes/comments:   Pt would like a dose of stadol now, with plan to get epidural in future time  Intact membranes   Uncharged in this cervical examination  FHT:  category I,  Base line 120, moderate variability, ctx q 2-4 min  Currently without decelerations      Will recheck pt in 2 hours, sooner if patient uncomfortable      Sacha Dominguez MD 2/29/2020 5:04 AM

## 2020-02-29 NOTE — OB LABOR/OXYTOCIN SAFETY PROGRESS
Oxytocin Safety Progress Check Note - Robert More 28 y o  female MRN: [id number]    Unit/Bed#: -01 Encounter: [id number]    Dose (julieta-units/min) Oxytocin: 14 julieta-units/min  Contraction Frequency (minutes): 3  Contraction Quality: Moderate  Tachysystole: No   Dilation: Lip/rim (Comment)        Effacement (%): 100  Station: 0  Baseline Rate: 145 bpm  Fetal Heart Rate: 148 BPM  FHR Category: Category I             Notes/comments:   Patient more comfortable, still somewhat numb  Will continue to monitor for labor progress      Marcus Trujillo MD 2/29/2020 4:09 PM

## 2020-02-29 NOTE — OB LABOR/OXYTOCIN SAFETY PROGRESS
Oxytocin Safety Progress Check Note - Damon More 28 y o  female MRN: [id number]    Unit/Bed#: -01 Encounter: [id number]    Dose (julieta-units/min) Oxytocin: 8 julieta-units/min  Contraction Frequency (minutes): 5  Contraction Quality: Moderate  Tachysystole: No   Dilation: 7-8        Effacement (%): 90  Station: 0  Baseline Rate: 140 bpm  Fetal Heart Rate: 131 BPM  FHR Category: Category I             Notes/comments:   Patient feeling some contractions  Will continue to monitor for progress      Sal Domingo MD 2/29/2020 11:55 AM

## 2020-02-29 NOTE — PROGRESS NOTES
Triage Note - OB  Ya More 28 y o  female MRN: [id number]  Unit/Bed#:  TRIAGE - Encounter: [id number]    OB TRIAGE NOTE  Ya More  [id number]  2020  12:57 AM  LD TRIAGE 1/LD TRIAGE 1-*    ASSESS:  28 y o   40w0d with     PLAN  #1  R/o labor:   · Cervical exam is slightly changed from morning check, now 3/70/-2  · Will recheck in 2 hours    #2  Discharge instructions  · Patient instructed to call if experiencing worsening contractions, vaginal bleeding, loss of fluid or decreased fetal movement  · Will follow up on 3/4/2020 for induction of labor    D/w Dr Brandy Hu  ______________    SUBJECTIVE    [place]: Estimated Date of Delivery: 20    HPI Chronology:  28 y o   40w0d presents with complaint of feeling contractions every 3 minutes for that past 2 hours  Patient was seen in the office by Dr Aurea Siddiqi this morning  At that time, patient's exam was   She endorses some vaginal bleeding during the day after the exam this morning  She denies loss of fluid  Contractions: yes  Leakage: no  Bleeding: yes  Fetal Movement: yes  Pelvic pain: no    Vitals:   /76 (BP Location: Left arm)   Pulse 75   Temp 98 3 °F (36 8 °C) (Temporal)   Resp 20   Ht 5' 8" (1 727 m)   Wt 83 5 kg (184 lb)   LMP 2019   SpO2 99%   BMI 27 98 kg/m²   Body mass index is 27 98 kg/m²  Review of Systems   Respiratory: Negative  Cardiovascular: Negative  Gastrointestinal: Negative  Genitourinary: Positive for vaginal bleeding  Negative for dysuria, urgency, vaginal discharge and vaginal pain  Physical Exam   Cardiovascular: Normal rate and regular rhythm  Pulmonary/Chest: Effort normal and breath sounds normal    Abdominal: Soft    gravid   Vitals reviewed  FHT:     TOCO:        Labs: No results found for this or any previous visit (from the past 24 hour(s))  Lab, Imaging and other studies: I have personally reviewed pertinent reports          Luann Lu MD  2/29/2020  12:57 AM

## 2020-02-29 NOTE — OB LABOR/OXYTOCIN SAFETY PROGRESS
Oxytocin Safety Progress Check Note - Pedrito More 28 y o  female MRN: [id number]    Unit/Bed#: -01 Encounter: [id number]    Dose (julieta-units/min) Oxytocin: 14 julieta-units/min  Contraction Frequency (minutes): 2-3  Contraction Quality: Moderate  Tachysystole: No   Dilation: 10     Dilation Complete Time: 1745  Effacement (%): 100  Station: 0  Baseline Rate: 130 bpm  Fetal Heart Rate: 144 BPM  FHR Category: Category I             Notes/comments:   Patient feeling minimal pressure  Will start pushing process, appropriate trial push      Charisse Hemphill MD 2/29/2020 5:51 PM

## 2020-02-29 NOTE — H&P
H&P Exam - Obstetrics   Courtney Child Stellate 28 y o  female MRN: [id number]  Unit/Bed#: LD TRIAGE 1 Encounter: [id number]      History of Present Illness     Chief Complaint: Contractions    HPI:  Ganesh Mayes is a 28 y o   female  At 36 weeks with an [place] of 2020, by Last Menstrual Period at 40w0d weeks gestation who is being admitted for labor   She presents with complaint of feeling contractions every 3 minutes for that past 2 hours  Patient was seen in the office by Dr Jana Noriega this morning  At that time, patient's exam was   She endorses some vaginal bleeding during the day after the exam this morning  She denies loss of fluid  Pregnancy complicated by advanced maternal age and marginal placenta cord insertion  GBS negative  Last estimated fetal weight 2019 1652 g 42 percentile  Contractions: yes  Loss of fluid: no  Vaginal bleeding: yes  Fetal movement: yes    She is BL patient  PREGNANCY COMPLICATIONS:   1) advanced maternal age  3) marginal insertion of umbilical cord      OB History    Para Term  AB Living   1             SAB TAB Ectopic Multiple Live Births                  # Outcome Date GA Lbr Shawn/2nd Weight Sex Delivery Anes PTL Lv   1 Current                Baby complications/comments:  none    Review of Systems   Constitutional: Negative  HENT: Negative  Eyes: Negative  Respiratory: Negative  Cardiovascular: Negative  Gastrointestinal: Positive for abdominal pain  Endocrine: Negative  Genitourinary: Negative  Musculoskeletal: Negative  Allergic/Immunologic: Negative  Neurological: Negative  Hematological: Negative  Psychiatric/Behavioral: Negative  Historical Information   Past Medical History:   Diagnosis Date    Varicella     childhood     No past surgical history on file    Social History   Social History     Substance and Sexual Activity   Alcohol Use Not Currently     Social History     Substance and Sexual Activity   Drug Use Yes    Types: Marijuana    Comment: last used  apr 2019     Social History     Tobacco Use   Smoking Status Never Smoker   Smokeless Tobacco Never Used     Family History: Non contributory    Meds/Allergies      Medications Prior to Admission   Medication    aspirin 81 mg chewable tablet    BIOTIN PO    Doxylamine Succinate, Sleep, (UNISOM PO)    Multiple Vitamins-Minerals (HAIR SKIN & NAILS ADVANCED) TABS    omeprazole (PriLOSEC) 40 MG capsule    Prenatal MV-Min-Fe Fum-FA-DHA (PRENATAL 1 PO)      No Known Allergies    OBJECTIVE:    Vitals: Blood pressure 113/76, pulse 75, temperature 98 3 °F (36 8 °C), temperature source Temporal, resp  rate 20, height 5' 8" (1 727 m), weight 83 5 kg (184 lb), last menstrual period 05/25/2019, SpO2 99 %, not currently breastfeeding  Body mass index is 27 98 kg/m²  Physical Exam   Constitutional: She is oriented to person, place, and time  She appears well-developed and well-nourished  HENT:   Head: Normocephalic and atraumatic  Right Ear: External ear normal    Eyes: Pupils are equal, round, and reactive to light  Conjunctivae and EOM are normal    Neck: Normal range of motion  Neck supple  Cardiovascular: Normal rate, regular rhythm, normal heart sounds and intact distal pulses  Pulmonary/Chest: Effort normal and breath sounds normal    Abdominal: Soft  Genitourinary: Vagina normal    Musculoskeletal: Normal range of motion  Neurological: She is alert and oriented to person, place, and time  Psychiatric: She has a normal mood and affect           Cervix:  Dilation: 4  Effacement (%): 70  Station: -2    Fetal heart rate:   Baseline Rate: 120 bpm  Variability: Moderate 6-25 bpm(marked variability noted at times )  Accelerations: 15 x 15 or greater, At variable times  Decelerations: None  FHR Category: Category I    Sublimity:   Contraction Frequency (minutes): 4-6  Contraction Duration (seconds):   Contraction Quality: Mild, Moderate    EFW: last estimated fetal weight 2019 1652 g 42 percentile          GBS:  Negative    Prenatal Labs:   Blood Type:   Lab Results   Component Value Date/Time    ABO Grouping A 2019 09:57 AM     , D (Rh type):   Lab Results   Component Value Date/Time    Rh Factor Positive 2019 09:57 AM     , Antibody Screen: No results found for: ANTIBODYSCR , HCT/HGB:   Lab Results   Component Value Date/Time    HCT 34 2 (L) 2019 11:30 AM    Hematocrit 34 7 (L) 2019 09:57 AM    Hemoglobin 11 8 2019 11:30 AM    Hemoglobin 12 3 2019 09:57 AM      , MCV:   Lab Results   Component Value Date/Time    MCV 92 7 2019 11:30 AM    MCV 90 2019 09:57 AM      , Platelets:   Lab Results   Component Value Date/Time    Platelet Count 252  11:30 AM    Platelets 231  09:57 AM      , 1 hour Glucola:   Lab Results   Component Value Date/Time    Glucose 127 2019 11:30 AM   , Varicella: No results found for: VARICELLAIGG    , Rubella:   Lab Results   Component Value Date/Time    Rubella IgG Quant 74 0 2019 09:57 AM        , VDRL/RPR:   Lab Results   Component Value Date/Time    RPR NON-REACTIVE 2019 11:30 AM    RPR Non-Reactive 2019 09:57 AM      , Urine Drug Screen: No results found for: AMPHETUR, BARBTUR, BDZUR, THCUR, COCAINEUR, METHADONEUR, OPIATEUR, PCPUR, MTHAMUR, ECSTASYUR, TRICYCLICSUR, Hep B:   Lab Results   Component Value Date/Time    Hepatitis B Surface Ag Non-reactive 2019 09:57 AM     , HIV:   Lab Results   Component Value Date/Time    HIV-1/HIV-2 Ab Non-Reactive 2019 09:57 AM     , Chlamydia: No results found for: EXTCHLAMYDIA  , Gonorrhea: No results found for: LABNGO  , Group B Strep:    Lab Results   Component Value Date/Time    Strep Grp B PCR Negative for Beta Hemolytic Strep Grp B by PCR 2020 10:47 AM          Invasive Devices     None                   Assessment/Plan     ASSESSMENT:  27 yo  at 40w0d weeks gestation who is being admitted for labor  Pregnancy complicated by:  Advanced maternal age, marginal cord insertion  SVE:  /-2  FHT: 130  Clinical EFW: 7 5g ; Vertex confirmed by U/S  GBS status: negative   Rh:  Positive    PLAN:    - Admit  - CBC, RPR, Blood Type  - Start with expectant management  - Method of contraception: pills  - GBS negative status:  No PCN for prophylaxis   - Analgesia and/or epidural at patient request  - Anticipate     Discussed with Dr Francine Zavala      This patient will be an INPATIENT  and I certify the anticipated length of stay is >2 Midnights      Miguel Rivera MD  2020  2:49 AM

## 2020-03-01 PROCEDURE — 99024 POSTOP FOLLOW-UP VISIT: CPT | Performed by: OBSTETRICS & GYNECOLOGY

## 2020-03-01 RX ADMIN — IBUPROFEN 600 MG: 600 TABLET ORAL at 21:34

## 2020-03-01 RX ADMIN — Medication 1 TABLET: at 08:25

## 2020-03-01 RX ADMIN — IBUPROFEN 600 MG: 600 TABLET ORAL at 08:25

## 2020-03-01 RX ADMIN — IBUPROFEN 600 MG: 600 TABLET ORAL at 15:40

## 2020-03-01 RX ADMIN — PANTOPRAZOLE SODIUM 20 MG: 20 TABLET, DELAYED RELEASE ORAL at 08:25

## 2020-03-01 RX ADMIN — DOCUSATE SODIUM 100 MG: 100 CAPSULE, LIQUID FILLED ORAL at 08:25

## 2020-03-01 NOTE — PLAN OF CARE
Problem: Potential for Falls  Goal: Patient will remain free of falls  Description  INTERVENTIONS:  - Assess patient frequently for physical needs  -  Identify cognitive and physical deficits and behaviors that affect risk of falls    -  [place] fall precautions as indicated by assessment   - Educate patient/family on patient safety including physical limitations  - Instruct patient to call for assistance with activity based on assessment  - Modify environment to reduce risk of injury  - Consider OT/PT consult to assist with strengthening/mobility  Outcome: Progressing     Problem: PAIN - ADULT  Goal: Verbalizes/displays adequate comfort level or baseline comfort level  Description  Interventions:  - Encourage patient to monitor pain and request assistance  - Assess pain using appropriate pain scale  - Administer analgesics based on type and severity of pain and evaluate response  - Implement non-pharmacological measures as appropriate and evaluate response  - Consider cultural and social influences on pain and pain management  - Notify physician/advanced practitioner if interventions unsuccessful or patient reports new pain  Outcome: Progressing     Problem: INFECTION - ADULT  Goal: Absence or prevention of progression during hospitalization  Description  INTERVENTIONS:  - Assess and monitor for signs and symptoms of infection  - Monitor lab/diagnostic results  - Monitor all insertion sites, i e  indwelling lines, tubes, and drains  - Monitor endotracheal if appropriate and nasal secretions for changes in amount and color  - [place] appropriate cooling/warming therapies per order  - Administer medications as ordered  - Instruct and encourage patient and family to use good hand hygiene technique  - Identify and instruct in appropriate isolation precautions for identified infection/condition  Outcome: Progressing  Goal: Absence of fever/infection during neutropenic period  Description  INTERVENTIONS:  - Monitor WBC    Outcome: Progressing     Problem: SAFETY ADULT  Goal: Maintain or return to baseline ADL function  Description  INTERVENTIONS:  -  Assess patient's ability to carry out ADLs; assess patient's baseline for ADL function and identify physical deficits which impact ability to perform ADLs (bathing, care of mouth/teeth, toileting, grooming, dressing, etc )  - Assess/evaluate cause of self-care deficits   - Assess range of motion  - Assess patient's mobility; develop plan if impaired  - Assess patient's need for assistive devices and provide as appropriate  - Encourage maximum independence but intervene and supervise when necessary  - Involve family in performance of ADLs  - Assess for home care needs following discharge   - Consider OT consult to assist with ADL evaluation and planning for discharge  - Provide patient education as appropriate  Outcome: Progressing  Goal: Maintain or return mobility status to optimal level  Description  INTERVENTIONS:  - Assess patient's baseline mobility status (ambulation, transfers, stairs, etc )    - Identify cognitive and physical deficits and behaviors that affect mobility  - Identify mobility aids required to assist with transfers and/or ambulation (gait belt, sit-to-stand, lift, walker, cane, etc )  - [place] fall precautions as indicated by assessment  - Record patient progress and toleration of activity level on Mobility SBAR; progress patient to next Phase/Stage  - Instruct patient to call for assistance with activity based on assessment  - Consider rehabilitation consult to assist with strengthening/weightbearing, etc   Outcome: Progressing     Problem: DISCHARGE PLANNING  Goal: Discharge to home or other facility with appropriate resources  Description  INTERVENTIONS:  - Identify barriers to discharge w/patient and caregiver  - Arrange for needed discharge resources and transportation as appropriate  - Identify discharge learning needs (meds, wound care, etc )  - Arrange for interpretive services to assist at discharge as needed  - Refer to Case Management Department for coordinating discharge planning if the patient needs post-hospital services based on physician/advanced practitioner order or complex needs related to functional status, cognitive ability, or social support system  Outcome: Progressing

## 2020-03-01 NOTE — PLAN OF CARE
Problem: Potential for Falls  Goal: Patient will remain free of falls  Description  INTERVENTIONS:  - Assess patient frequently for physical needs  -  Identify cognitive and physical deficits and behaviors that affect risk of falls    -  [place] fall precautions as indicated by assessment   - Educate patient/family on patient safety including physical limitations  - Instruct patient to call for assistance with activity based on assessment  - Modify environment to reduce risk of injury  - Consider OT/PT consult to assist with strengthening/mobility  3/1/2020 0132 by Landy Gonzalez RN  Outcome: Progressing  3/1/2020 0129 by Landy Gonzalez RN  Outcome: Progressing     Problem: PAIN - ADULT  Goal: Verbalizes/displays adequate comfort level or baseline comfort level  Description  Interventions:  - Encourage patient to monitor pain and request assistance  - Assess pain using appropriate pain scale  - Administer analgesics based on type and severity of pain and evaluate response  - Implement non-pharmacological measures as appropriate and evaluate response  - Consider cultural and social influences on pain and pain management  - Notify physician/advanced practitioner if interventions unsuccessful or patient reports new pain  3/1/2020 0132 by Landy Gonzalez RN  Outcome: Progressing  3/1/2020 0129 by Landy Gonzalez RN  Outcome: Progressing     Problem: INFECTION - ADULT  Goal: Absence or prevention of progression during hospitalization  Description  INTERVENTIONS:  - Assess and monitor for signs and symptoms of infection  - Monitor lab/diagnostic results  - Monitor all insertion sites, i e  indwelling lines, tubes, and drains  - Monitor endotracheal if appropriate and nasal secretions for changes in amount and color  - [place] appropriate cooling/warming therapies per order  - Administer medications as ordered  - Instruct and encourage patient and family to use good hand hygiene technique  - Identify and instruct in appropriate isolation precautions for identified infection/condition  3/1/2020 0132 by Arash Quinn RN  Outcome: Progressing  3/1/2020 0129 by Arash Quinn RN  Outcome: Progressing  Goal: Absence of fever/infection during neutropenic period  Description  INTERVENTIONS:  - Monitor WBC    3/1/2020 0132 by Arash Quinn RN  Outcome: Progressing  3/1/2020 0129 by Arash Quinn RN  Outcome: Progressing     Problem: SAFETY ADULT  Goal: Maintain or return to baseline ADL function  Description  INTERVENTIONS:  -  Assess patient's ability to carry out ADLs; assess patient's baseline for ADL function and identify physical deficits which impact ability to perform ADLs (bathing, care of mouth/teeth, toileting, grooming, dressing, etc )  - Assess/evaluate cause of self-care deficits   - Assess range of motion  - Assess patient's mobility; develop plan if impaired  - Assess patient's need for assistive devices and provide as appropriate  - Encourage maximum independence but intervene and supervise when necessary  - Involve family in performance of ADLs  - Assess for home care needs following discharge   - Consider OT consult to assist with ADL evaluation and planning for discharge  - Provide patient education as appropriate  3/1/2020 0132 by Arash Quinn RN  Outcome: Progressing  3/1/2020 0129 by Arash Quinn RN  Outcome: Progressing  Goal: Maintain or return mobility status to optimal level  Description  INTERVENTIONS:  - Assess patient's baseline mobility status (ambulation, transfers, stairs, etc )    - Identify cognitive and physical deficits and behaviors that affect mobility  - Identify mobility aids required to assist with transfers and/or ambulation (gait belt, sit-to-stand, lift, walker, cane, etc )  - [place] fall precautions as indicated by assessment  - Record patient progress and toleration of activity level on Mobility SBAR; progress patient to next Phase/Stage  - Instruct patient to call for assistance with activity based on assessment  - Consider rehabilitation consult to assist with strengthening/weightbearing, etc   3/1/2020 0132 by Lili Stiles RN  Outcome: Progressing  3/1/2020 0129 by Lili Stiles RN  Outcome: Progressing     Problem: DISCHARGE PLANNING  Goal: Discharge to home or other facility with appropriate resources  Description  INTERVENTIONS:  - Identify barriers to discharge w/patient and caregiver  - Arrange for needed discharge resources and transportation as appropriate  - Identify discharge learning needs (meds, wound care, etc )  - Arrange for interpretive services to assist at discharge as needed  - Refer to Case Management Department for coordinating discharge planning if the patient needs post-hospital services based on physician/advanced practitioner order or complex needs related to functional status, cognitive ability, or social support system  3/1/2020 0132 by Lili Stiles RN  Outcome: Progressing  3/1/2020 0129 by Lili Stiles RN  Outcome: Progressing

## 2020-03-01 NOTE — PROGRESS NOTES
Progress Note - OB/GYN   Neil Faye More 28 y o  female MRN: [id number]  Unit/Bed#: -01 Encounter: [id number]    Jolene Foote is a 28 y o  [place] female, PPD#1 s/p Spontaneous Vaginal Delivery at 40w0d  ASSESSMENT:  PPD#1  Recovering well  PLAN:  1  Post partum   - Continue routine post partum care  - BP's WNL   - Rh positive   - Pain control: PO meds on board   - DVT ppx: ambulation   - Encourage ambulation  - Encourage breastfeeding    2  Discharge planning  - Vaccines: none  - Anticipate discharge PPD#2    SUBJECTIVE:  Pt feels well  She has no major complaints  Pain: some, responding to PO meds  Tolerating PO: yes  Voiding: yes  Flatus: yes  Bm: no  Ambulating: not much   Breastfeeding: yes  Chest pain: no  Shortness of breath: no  Leg pain: no  Lochia: WNL    OBJECTIVE:   Vitals:    03/01/20 0807   BP: 104/67   Pulse: 80   Resp: 18   Temp: 98 °F (36 7 °C)   SpO2:      Physical Exam:   Body mass index is 27 98 kg/m²  Constitutional: Well-developed, well-nourished  NAD  HEENT: NC/AT  Conjunctivae normal    Cardiovascular: RRR, S1/S2 normal    Pulmonary: Normal respiratory effort  Lung sounds CTAB  Abdominal: Soft, non-distended, non-tender  Uterus firm below umbilicus  MSK: Normal range of motion  Extremities: non-tender  Neurological: Alert and oriented to person, place, and time  Skin: Skin is warm and dry       I/O       02/28 0701 - 02/29 0700 02/29 0701 - 03/01 0700 03/01 0701 - 03/02 0700    I V  (mL/kg) 1000 (12)      Total Intake(mL/kg) 1000 (12)      Urine (mL/kg/hr)  2100 (1)     Blood  537     Total Output  2637     Net +1000 -2637                Lab Results   Component Value Date    WBC 14 46 (H) 02/29/2020    HGB 11 4 (L) 02/29/2020    HCT 34 4 (L) 02/29/2020    MCV 91 02/29/2020     02/29/2020       Deysi Simmons MD  PGY-1, OB/GYN  3/1/2020 9:12 AM

## 2020-03-01 NOTE — L&D DELIVERY NOTE
DELIVERY NOTE  Jessie More 28 y o  female MRN: [id number]  Unit/Bed#:  205- Encounter: [id number]    Obstetrician:    Dr Shakila Davison MD    Assistant:   Dr Tolliver Monday    Pre-Delivery Diagnosis:   Patient Active Problem List   Diagnosis    Hypertriglyceridemia    Insomnia    Skin rash    Allergic rhinitis    Midepigastric pain    Encounter for supervision of normal first pregnancy in third trimester    Marginal insertion of umbilical cord affecting management of mother in third trimester    Heartburn during pregnancy in third trimester    40 weeks gestation of pregnancy           Post-Delivery Diagnosis:   Same as above - Delivered  2nd degree laceration    Procedure:  Spontaneous vaginal delivery  Repair 2nd degree spontaneous laceration      Anesthesia:  epidural    Specimens:   Cord blood obtained   Placenta; normal appearing, central insertion, intact   Arterial and venous blood gases (below)     Gases:  Umbilical Cord Venous Blood Gas:  Results from last 7 days   Lab Units 20   PH COV  7 339   PCO2 COV mm HG 40 3   HCO3 COV mmol/L 21 2   BASE EXC COV mmol/L -4 2*   O2 CT CD VB mL/dL 15 4   O2 HGB, VENOUS CORD % 05 7     Umbilical Cord Arterial Blood Gas:  Results from last 7 days   Lab Units 20   PH COA  7 291   PCO2 COA  53 3   PO2 COA mm HG 22 0   HCO3 COA mmol/L 25 1   BASE EXC COA mmol/L -2 3*   O2 CONTENT CORD ART ml/dl 10 2   O2 HGB, ARTERIAL CORD % 47 4       Quantitative Blood Loss:   537 mL           Complications:    None    Brief Description of Labor Course:  Dionna Abrams is a 28 y o   female at 37w0d who was admitted to L&D for labor  Her labor course was notable for augmentation with AROM for clear fluid, epidural for pain management  She progressed to complete at 1745, and began pushing at 1745  She pushed for 92 min, during which time warm compress and perineal massage were implemented  She delivered a healthy  at 200  Description of Delivery:   With  the assistance of maternal expulsive forces, the fetal vertex delivered spontaneously  A nuchal cord was not noted  The anterior left shoulder was delivered atraumatically with gentle downward traction  The contralateral arm was delivered with gentle upward traction  The remainder of the fetus delivered spontaneously at 191, resulting in a viable female   Upon delivery, the infant was placed on the mothers abdomen and the cord was clamped and cut after 30 seconds  The  was noted to have good tone and cry spontaneously  There was no evidence of injury  The  was passed off to  staff for evaluation  Umbilical cord blood and umbilical artery and venous gases were collected and sent to the lab  An intact placenta was delivered spontaneously at 192 using fundal massage and gentle cord traction and was noted to have a marginally-inserted 3-vessel cord  Active management of the third stage of labor was undertaken with IV pitocin at 250 milliunits/min  Inspection of the perineum, vagina, labia, cervix, and urethra revealed a 2nd degree laceration  3-0 vicryl rapide was selected for the repair suture  The patient had an epidural in place for anesthesia  The apex of the vaginal laceration was identified and an anchoring suture was placed 1 cm above the apex  The vaginal mucosa and underlying rectovaginal fascia were closed in a running locked fashion to the hymenal ring  The suture was then brought underneath the hymenal ring  Continuing with the same suture, the transverse perineal muscles were reapproximated with a transverse running suture  The suture was brought to the posterior apex of the skin laceration and then the skin was reapproximated in a subcuticular fashion to the hymenal ring  Excellent hemostasis was achieved  Bleeding was noted to be under control  A bimanual exam was performed         Outcome:  Living  with APGARS 9 (1 min) and 9 (5 min)   weight: 8 lb    At the conclusion of the delivery, all needle, sponge, and instrument counts were noted to be correct  Patient tolerated the procedure well and was allowed to recover in labor and delivery room with family and  before being transferred to the post-partum floor  Conclusion:  Mother and baby are currently recovering nicely in stable condition  Attending Supervision:   Dr Angela Balbuena MD was present for the entire procedure      Razia Lopez MD  PGY-1 OB/GYN   2020 9:08 PM

## 2020-03-01 NOTE — PLAN OF CARE
Problem: Potential for Falls  Goal: Patient will remain free of falls  Description  INTERVENTIONS:  - Assess patient frequently for physical needs  -  Identify cognitive and physical deficits and behaviors that affect risk of falls  -  [place] fall precautions as indicated by assessment   - Educate patient/family on patient safety including physical limitations  - Instruct patient to call for assistance with activity based on assessment  - Modify environment to reduce risk of injury  - Consider OT/PT consult to assist with strengthening/mobility  Outcome: Progressing     Problem: Knowledge Deficit  Goal: Verbalizes understanding of labor plan  Description  Assess patient/family/caregiver's baseline knowledge level and ability to understand information  Provide education via patient/family/caregiver's preferred learning method at appropriate level of understanding  1  Provide teaching at level of understanding  2  Provide teaching via preferred learning method(s)  Outcome: Progressing  Goal: Patient/family/caregiver demonstrates understanding of disease process, treatment plan, medications, and discharge instructions  Description  Complete learning assessment and assess knowledge base    Interventions:  - Provide teaching at level of understanding  - Provide teaching via preferred learning methods  Outcome: Progressing     Problem: PAIN - ADULT  Goal: Verbalizes/displays adequate comfort level or baseline comfort level  Description  Interventions:  - Encourage patient to monitor pain and request assistance  - Assess pain using appropriate pain scale  - Administer analgesics based on type and severity of pain and evaluate response  - Implement non-pharmacological measures as appropriate and evaluate response  - Consider cultural and social influences on pain and pain management  - Notify physician/advanced practitioner if interventions unsuccessful or patient reports new pain  Outcome: Progressing Problem: INFECTION - ADULT  Goal: Absence or prevention of progression during hospitalization  Description  INTERVENTIONS:  - Assess and monitor for signs and symptoms of infection  - Monitor lab/diagnostic results  - Monitor all insertion sites, i e  indwelling lines, tubes, and drains  - Monitor endotracheal if appropriate and nasal secretions for changes in amount and color  - [place] appropriate cooling/warming therapies per order  - Administer medications as ordered  - Instruct and encourage patient and family to use good hand hygiene technique  - Identify and instruct in appropriate isolation precautions for identified infection/condition  Outcome: Progressing  Goal: Absence of fever/infection during neutropenic period  Description  INTERVENTIONS:  - Monitor WBC    Outcome: Progressing     Problem: SAFETY ADULT  Goal: Maintain or return to baseline ADL function  Description  INTERVENTIONS:  -  Assess patient's ability to carry out ADLs; assess patient's baseline for ADL function and identify physical deficits which impact ability to perform ADLs (bathing, care of mouth/teeth, toileting, grooming, dressing, etc )  - Assess/evaluate cause of self-care deficits   - Assess range of motion  - Assess patient's mobility; develop plan if impaired  - Assess patient's need for assistive devices and provide as appropriate  - Encourage maximum independence but intervene and supervise when necessary  - Involve family in performance of ADLs  - Assess for home care needs following discharge   - Consider OT consult to assist with ADL evaluation and planning for discharge  - Provide patient education as appropriate  Outcome: Progressing  Goal: Maintain or return mobility status to optimal level  Description  INTERVENTIONS:  - Assess patient's baseline mobility status (ambulation, transfers, stairs, etc )    - Identify cognitive and physical deficits and behaviors that affect mobility  - Identify mobility aids required to assist with transfers and/or ambulation (gait belt, sit-to-stand, lift, walker, cane, etc )  - [place] fall precautions as indicated by assessment  - Record patient progress and toleration of activity level on Mobility SBAR; progress patient to next Phase/Stage  - Instruct patient to call for assistance with activity based on assessment  - Consider rehabilitation consult to assist with strengthening/weightbearing, etc   Outcome: Progressing     Problem: DISCHARGE PLANNING  Goal: Discharge to home or other facility with appropriate resources  Description  INTERVENTIONS:  - Identify barriers to discharge w/patient and caregiver  - Arrange for needed discharge resources and transportation as appropriate  - Identify discharge learning needs (meds, wound care, etc )  - Arrange for interpretive services to assist at discharge as needed  - Refer to Case Management Department for coordinating discharge planning if the patient needs post-hospital services based on physician/advanced practitioner order or complex needs related to functional status, cognitive ability, or social support system  Outcome: Progressing

## 2020-03-01 NOTE — LACTATION NOTE
This note was copied from a baby's chart  Assisted infant to breast in cross cradle hold  Poor positioning noted  initially, but infant then did latch well  Reviewed signs of proper positioning and latch

## 2020-03-01 NOTE — LACTATION NOTE
This note was copied from a baby's chart  Mom states infant feeding well on right side but C/O blister and pain  Infant on and off left side  Is using Lanolin Cream  To call for latch assistance with next feeding  Reviewed expected  infant feeding patterns in the first few days and encouraged feeding on cue  Given admission breastfeeding pkat and same reviewed with pt

## 2020-03-02 ENCOUNTER — TELEPHONE (OUTPATIENT)
Dept: OBGYN CLINIC | Facility: CLINIC | Age: 35
End: 2020-03-02

## 2020-03-02 VITALS
BODY MASS INDEX: 27.89 KG/M2 | HEART RATE: 73 BPM | DIASTOLIC BLOOD PRESSURE: 67 MMHG | SYSTOLIC BLOOD PRESSURE: 109 MMHG | RESPIRATION RATE: 20 BRPM | HEIGHT: 68 IN | WEIGHT: 184 LBS | OXYGEN SATURATION: 98 % | TEMPERATURE: 97.9 F

## 2020-03-02 LAB — RPR SER QL: NORMAL

## 2020-03-02 PROCEDURE — 99024 POSTOP FOLLOW-UP VISIT: CPT | Performed by: OBSTETRICS & GYNECOLOGY

## 2020-03-02 RX ORDER — IBUPROFEN 600 MG/1
600 TABLET ORAL EVERY 6 HOURS PRN
Qty: 30 TABLET | Refills: 0
Start: 2020-03-02 | End: 2020-04-24 | Stop reason: ALTCHOICE

## 2020-03-02 RX ORDER — DIAPER,BRIEF,INFANT-TODD,DISP
1 EACH MISCELLANEOUS AS NEEDED
Qty: 30 G | Refills: 0
Start: 2020-03-02 | End: 2020-03-25

## 2020-03-02 RX ORDER — ACETAMINOPHEN 325 MG/1
650 TABLET ORAL EVERY 6 HOURS PRN
Qty: 30 TABLET | Refills: 0
Start: 2020-03-02 | End: 2020-04-24 | Stop reason: ALTCHOICE

## 2020-03-02 RX ORDER — DOCUSATE SODIUM 100 MG/1
100 CAPSULE, LIQUID FILLED ORAL 2 TIMES DAILY
Qty: 10 CAPSULE | Refills: 0
Start: 2020-03-02 | End: 2020-04-24 | Stop reason: ALTCHOICE

## 2020-03-02 RX ADMIN — Medication 1 TABLET: at 09:08

## 2020-03-02 RX ADMIN — DOCUSATE SODIUM 100 MG: 100 CAPSULE, LIQUID FILLED ORAL at 09:07

## 2020-03-02 RX ADMIN — IBUPROFEN 600 MG: 600 TABLET ORAL at 09:07

## 2020-03-02 RX ADMIN — PANTOPRAZOLE SODIUM 20 MG: 20 TABLET, DELAYED RELEASE ORAL at 09:08

## 2020-03-02 RX ADMIN — ACETAMINOPHEN 650 MG: 325 TABLET, FILM COATED ORAL at 09:06

## 2020-03-02 NOTE — TELEPHONE ENCOUNTER
Patient is being discharged from hospital after delivery today  Tylenol and motrin together did not give any pain relief  Jacqui Fountain sent a message to Dr Marva Foster to review if percocet can be ordered for patient

## 2020-03-02 NOTE — TELEPHONE ENCOUNTER
Per Dr Camryn De La Rosa  yes just motrin, we don't have people use percocet after leave hospital, tell her must avoid constipation  Tell her the local care works best motrin to take down swelling and then the numbing spray and rinse bottle     lmom with details listed above  Advised to call the office if has another questions or concerns

## 2020-03-02 NOTE — DISCHARGE SUMMARY
Discharge Summary -   Nabor More 28 y o  female MRN: [id number]  Unit/Bed#: -01 Encounter: [id number]    Admission Date: 2020     Discharge Date: 3/2/2020    Discharging Attending: Mateusz Bryson MD    Principal Diagnosis: Term pregnancy  Spontaneous labor  Single fetus    Secondary Diagnosis: Term pregnancy  Spontaneous labor  Single fetus    Procedures: Spontaneous vaginal delivery    Hospital Course: Patient delivered and underwent normal post delivery care  She is ambulating well, voiding on her own, passing flatus, and tolerating oral intake        Complications: None,     Condition at discharge: good     Discharge Medications:  Current Discharge Medication List      START taking these medications    Details   acetaminophen (TYLENOL) 325 mg tablet Take 2 tablets (650 mg total) by mouth every 6 (six) hours as needed for mild pain, moderate pain, severe pain or fever  Qty: 30 tablet, Refills: 0    Associated Diagnoses:  (spontaneous vaginal delivery)      docusate sodium (COLACE) 100 mg capsule Take 1 capsule (100 mg total) by mouth 2 (two) times a day  Qty: 10 capsule, Refills: 0    Associated Diagnoses:  (spontaneous vaginal delivery)      hydrocortisone 1 % cream Apply 1 application topically as needed for irritation  Qty: 30 g, Refills: 0    Associated Diagnoses:  (spontaneous vaginal delivery)      ibuprofen (MOTRIN) 600 mg tablet Take 1 tablet (600 mg total) by mouth every 6 (six) hours as needed for mild pain (cramping)  Qty: 30 tablet, Refills: 0    Associated Diagnoses:  (spontaneous vaginal delivery)         CONTINUE these medications which have NOT CHANGED    Details   BIOTIN PO Take 2 tablets by mouth daily       Doxylamine Succinate, Sleep, (UNISOM PO) Take 2 tablets by mouth daily at bedtime      Multiple Vitamins-Minerals (HAIR SKIN & NAILS ADVANCED) TABS Take by mouth      omeprazole (PriLOSEC) 40 MG capsule Take 1 capsule (40 mg total) by mouth daily  Qty: 90 capsule, Refills: 0    Associated Diagnoses: Heartburn during pregnancy in third trimester      Prenatal MV-Min-Fe Fum-FA-DHA (PRENATAL 1 PO) Take 1 tablet by mouth daily          STOP taking these medications       aspirin 81 mg chewable tablet Comments:   Reason for Stopping:               Discharge instructions/Information to patient and family:   See after visit summary for information provided to patient and family  Provisions for Follow-Up Care:  See after visit summary for information related to follow-up care and any pertinent home health orders  Disposition: See After Visit Summary for discharge disposition information      Planned Readmission: No    Marcus Trujillo MD  3/2/2020  6:57 AM

## 2020-03-02 NOTE — PLAN OF CARE
Problem: Potential for Falls  Goal: Patient will remain free of falls  Description  INTERVENTIONS:  - Assess patient frequently for physical needs  -  Identify cognitive and physical deficits and behaviors that affect risk of falls    -  [place] fall precautions as indicated by assessment   - Educate patient/family on patient safety including physical limitations  - Instruct patient to call for assistance with activity based on assessment  - Modify environment to reduce risk of injury  - Consider OT/PT consult to assist with strengthening/mobility  Outcome: Progressing     Problem: PAIN - ADULT  Goal: Verbalizes/displays adequate comfort level or baseline comfort level  Description  Interventions:  - Encourage patient to monitor pain and request assistance  - Assess pain using appropriate pain scale  - Administer analgesics based on type and severity of pain and evaluate response  - Implement non-pharmacological measures as appropriate and evaluate response  - Consider cultural and social influences on pain and pain management  - Notify physician/advanced practitioner if interventions unsuccessful or patient reports new pain  Outcome: Progressing     Problem: INFECTION - ADULT  Goal: Absence or prevention of progression during hospitalization  Description  INTERVENTIONS:  - Assess and monitor for signs and symptoms of infection  - Monitor lab/diagnostic results  - Monitor all insertion sites, i e  indwelling lines, tubes, and drains  - Monitor endotracheal if appropriate and nasal secretions for changes in amount and color  - [place] appropriate cooling/warming therapies per order  - Administer medications as ordered  - Instruct and encourage patient and family to use good hand hygiene technique  - Identify and instruct in appropriate isolation precautions for identified infection/condition  Outcome: Progressing  Goal: Absence of fever/infection during neutropenic period  Description  INTERVENTIONS:  - Monitor WBC    Outcome: Progressing     Problem: SAFETY ADULT  Goal: Maintain or return to baseline ADL function  Description  INTERVENTIONS:  -  Assess patient's ability to carry out ADLs; assess patient's baseline for ADL function and identify physical deficits which impact ability to perform ADLs (bathing, care of mouth/teeth, toileting, grooming, dressing, etc )  - Assess/evaluate cause of self-care deficits   - Assess range of motion  - Assess patient's mobility; develop plan if impaired  - Assess patient's need for assistive devices and provide as appropriate  - Encourage maximum independence but intervene and supervise when necessary  - Involve family in performance of ADLs  - Assess for home care needs following discharge   - Consider OT consult to assist with ADL evaluation and planning for discharge  - Provide patient education as appropriate  Outcome: Progressing  Goal: Maintain or return mobility status to optimal level  Description  INTERVENTIONS:  - Assess patient's baseline mobility status (ambulation, transfers, stairs, etc )    - Identify cognitive and physical deficits and behaviors that affect mobility  - Identify mobility aids required to assist with transfers and/or ambulation (gait belt, sit-to-stand, lift, walker, cane, etc )  - [place] fall precautions as indicated by assessment  - Record patient progress and toleration of activity level on Mobility SBAR; progress patient to next Phase/Stage  - Instruct patient to call for assistance with activity based on assessment  - Consider rehabilitation consult to assist with strengthening/weightbearing, etc   Outcome: Progressing     Problem: DISCHARGE PLANNING  Goal: Discharge to home or other facility with appropriate resources  Description  INTERVENTIONS:  - Identify barriers to discharge w/patient and caregiver  - Arrange for needed discharge resources and transportation as appropriate  - Identify discharge learning needs (meds, wound care, etc )  - Arrange for interpretive services to assist at discharge as needed  - Refer to Case Management Department for coordinating discharge planning if the patient needs post-hospital services based on physician/advanced practitioner order or complex needs related to functional status, cognitive ability, or social support system  Outcome: Progressing

## 2020-03-02 NOTE — LACTATION NOTE
This note was copied from a baby's chart  CONSULT - LACTATION  Baby Girl Jesus Zarco) Stellate 2 days female MRN: [id number]    [address] Room / Bed:  311(N)/[facility](N) Encounter: [id number]    Maternal Information     MOTHER:  Tin Correa  Maternal Age: 28 y o    OB History: #: 1, Date: 20, Sex: Female, Weight: 3629 g (8 lb), GA: 40w0d, Delivery: Vaginal, Spontaneous, Apgar1: 9, Apgar5: 9, Living: Living, Birth Comments: None   Previouse breast reduction surgery? No    Lactation history:   Has patient previously breast fed: No   How long had patient previously breast fed:     Previous breast feeding complications:     History reviewed  No pertinent surgical history  Birth information:  YOB: 2020   Time of birth: 10:17 PM   Sex: female   Delivery type: Vaginal, Spontaneous   Birth Weight: 3629 g (8 lb)   Percent of Weight Change: -4%     Gestational Age: 37w0d   [unfilled]    Assessment     Breast and nipple assessment: cracked nipples and right     Assessment: normal assessment    Feeding assessment: feeding well  LATCH:  Latch: Grasps breast, tongue down, lips flanged, rhythmic sucking   Audible Swallowing: Spontaneous and intermittent (24 hours old)   Type of Nipple: Everted (After stimulation)   Comfort (Breast/Nipple): Engorged, cracked, bleeding, large blisters or bruises   Hold (Positioning): Partial assist, teach one side, mother does other, staff holds   [facility] Score: 7          Feeding recommendations:  breast feed on demand     Scheduled follow up for Renata Harrison at [address] at [facility] request for reassurance with breast feeding for  at 1230 pm   Demonstrated assembly of Susie pump from home  Information on hand expression given   Discussed benefits of knowing how to manually express breast including stimulating milk supply, softening nipple for latch and evacuating breast in the event of engorgement  Worked on positioning infant up at chest level and starting to feed infant with nose arriving at the nipple  Then, using areolar compression to achieve a deep latch that is comfortable and exchanges optimum amounts of milk  Met with mother to go over discharge breastfeeding booklet including the feeding log  Emphasized 8 or more (12) feedings in a 24 hour period, what to expect for the number of diapers per day of life and the progression of properties of the  stooling pattern  Reviewed breastfeeding and your lifestyle, storage and preparation of breast milk, how to keep you breast pump clean, the employed breastfeeding mother and paced bottle feeding handouts  Booklet included Breastfeeding Resources for after discharge including access to the number for the [employer]  Discussed s/s engorgement, blocked milk ducts, and mastitis  Discussed how to remedy at home and when to contact physician  Encouraged parents to call for assistance, questions, and concerns about breastfeeding  Extension provided      Shila Arceo RN 3/2/2020 9:50 AM

## 2020-03-02 NOTE — PLAN OF CARE
Problem: Potential for Falls  Goal: Patient will remain free of falls  Description  INTERVENTIONS:  - Assess patient frequently for physical needs  -  Identify cognitive and physical deficits and behaviors that affect risk of falls    -  [place] fall precautions as indicated by assessment   - Educate patient/family on patient safety including physical limitations  - Instruct patient to call for assistance with activity based on assessment  - Modify environment to reduce risk of injury  - Consider OT/PT consult to assist with strengthening/mobility  Outcome: Adequate for Discharge     Problem: PAIN - ADULT  Goal: Verbalizes/displays adequate comfort level or baseline comfort level  Description  Interventions:  - Encourage patient to monitor pain and request assistance  - Assess pain using appropriate pain scale  - Administer analgesics based on type and severity of pain and evaluate response  - Implement non-pharmacological measures as appropriate and evaluate response  - Consider cultural and social influences on pain and pain management  - Notify physician/advanced practitioner if interventions unsuccessful or patient reports new pain  Outcome: Adequate for Discharge     Problem: INFECTION - ADULT  Goal: Absence or prevention of progression during hospitalization  Description  INTERVENTIONS:  - Assess and monitor for signs and symptoms of infection  - Monitor lab/diagnostic results  - Monitor all insertion sites, i e  indwelling lines, tubes, and drains  - Monitor endotracheal if appropriate and nasal secretions for changes in amount and color  - [place] appropriate cooling/warming therapies per order  - Administer medications as ordered  - Instruct and encourage patient and family to use good hand hygiene technique  - Identify and instruct in appropriate isolation precautions for identified infection/condition  Outcome: Adequate for Discharge  Goal: Absence of fever/infection during neutropenic period  Description  INTERVENTIONS:  - Monitor WBC    Outcome: Adequate for Discharge     Problem: SAFETY ADULT  Goal: Maintain or return to baseline ADL function  Description  INTERVENTIONS:  -  Assess patient's ability to carry out ADLs; assess patient's baseline for ADL function and identify physical deficits which impact ability to perform ADLs (bathing, care of mouth/teeth, toileting, grooming, dressing, etc )  - Assess/evaluate cause of self-care deficits   - Assess range of motion  - Assess patient's mobility; develop plan if impaired  - Assess patient's need for assistive devices and provide as appropriate  - Encourage maximum independence but intervene and supervise when necessary  - Involve family in performance of ADLs  - Assess for home care needs following discharge   - Consider OT consult to assist with ADL evaluation and planning for discharge  - Provide patient education as appropriate  Outcome: Adequate for Discharge  Goal: Maintain or return mobility status to optimal level  Description  INTERVENTIONS:  - Assess patient's baseline mobility status (ambulation, transfers, stairs, etc )    - Identify cognitive and physical deficits and behaviors that affect mobility  - Identify mobility aids required to assist with transfers and/or ambulation (gait belt, sit-to-stand, lift, walker, cane, etc )  - [place] fall precautions as indicated by assessment  - Record patient progress and toleration of activity level on Mobility SBAR; progress patient to next Phase/Stage  - Instruct patient to call for assistance with activity based on assessment  - Consider rehabilitation consult to assist with strengthening/weightbearing, etc   Outcome: Adequate for Discharge     Problem: DISCHARGE PLANNING  Goal: Discharge to home or other facility with appropriate resources  Description  INTERVENTIONS:  - Identify barriers to discharge w/patient and caregiver  - Arrange for needed discharge resources and transportation as appropriate  - Identify discharge learning needs (meds, wound care, etc )  - Arrange for interpretive services to assist at discharge as needed  - Refer to Case Management Department for coordinating discharge planning if the patient needs post-hospital services based on physician/advanced practitioner order or complex needs related to functional status, cognitive ability, or social support system  Outcome: Adequate for Discharge

## 2020-03-02 NOTE — PROGRESS NOTES
Progress Note - OB/GYN  Post-Partum Physician Note   Sharron More 28 y o  female MRN: [id number]  Unit/Bed#:  311-01 Encounter: [id number]    Subjective/Objective   Chief Complaint: Postpartum    Subjective: Pain is controlled with current analgesics  Medications being used: ibuprofen (OTC)  Eating a regular diet without difficulty  Flatus Yes   Bowel movements are normal  Voiding without difficulty  Ambulating Yes  Breastfeeding Yes  Lochia Lochia  Lochia Color: Rubra  Amount: Minimal  Lochia Odor: None  Clots: None normal     Objective:    Vitals: Blood pressure 111/53, pulse 76, temperature 97 9 °F (36 6 °C), temperature source Oral, resp  rate 16, height 5' 8" (1 727 m), weight 83 5 kg (184 lb), last menstrual period 05/25/2019, SpO2 98 %, currently breastfeeding  No intake or output data in the 24 hours ending 03/02/20 0653    Physical Exam:  GEN: Susana More appears well, alert and oriented x 3, pleasant and cooperative    HEART: regular rhythm, normal S1 and S2, no murmurs, clicks, gallops or rubs   LUNGS: clear to auscultation bilaterally; no wheezes, rales, or rhonchi   ABDOMEN: normal bowel sounds, soft, no tenderness, no distention  : Uterus firm at umbilicus nontender u-1  EXTREMITIES: peripheral pulses normal; no clubbing, cyanosis, or edema      Labs: No results found for this or any previous visit (from the past 24 hour(s))        MEDS:   Current Facility-Administered Medications   Medication Dose Route Frequency    acetaminophen (TYLENOL) tablet 650 mg  650 mg Oral Q6H PRN    calcium carbonate (TUMS) chewable tablet 1,000 mg  1,000 mg Oral Daily PRN    docusate sodium (COLACE) capsule 100 mg  100 mg Oral BID    hydrocortisone 1 % cream 1 application  1 application Topical PRN    ibuprofen (MOTRIN) tablet 600 mg  600 mg Oral Q6H PRN    magnesium hydroxide (MILK OF MAGNESIA) 400 mg/5 mL oral suspension 15 mL  15 mL Oral Daily PRN    pantoprazole (PROTONIX) EC tablet 20 mg  20 mg Oral Early Morning    prenatal multivitamin tablet 1 tablet  1 tablet Oral Daily    simethicone (MYLICON) chewable tablet 80 mg  80 mg Oral 4x Daily PRN    witch hazel-glycerin (TUCKS) topical pad 1 pad  1 pad Topical PRN     Invasive Devices     Peripheral Intravenous Line            Peripheral IV 20 Right Arm 2 days                  Assessment/Plan     Assessment:  Patient Active Problem List   Diagnosis    Hypertriglyceridemia    Insomnia    Skin rash    Allergic rhinitis    Midepigastric pain    Encounter for supervision of normal first pregnancy in third trimester    Marginal insertion of umbilical cord affecting management of mother in third trimester    Heartburn during pregnancy in third trimester    40 weeks gestation of pregnancy     (spontaneous vaginal delivery)       Plan:  1) Postpartum day #  2 status post spontaneous vaginal delivery  2) d/c to home  Instructions given      Melissa Whaley MD  3/2/2020  6:53 AM

## 2020-03-02 NOTE — SOCIAL WORK
Consult(s): Hx THC use     · Baby's name/gender: Ayden Rodriguez, Female  · Delivery method/date: Vaginal, 2/29/2020  · Gestational Age: 37wks      · NICU/Nursery: Nursery     CM met with patient to introduce CM services, complete assessment, and provide CM contact info      Pt had  Zulema Beatty present and verbalized agreement with personal interview with them present      Pt reported the following:     · Pt/Mother of baby: Teri Gutierrez - [phone]  · Father of baby: Ambrosio Cifuentes -    · Other Legal Guardian(s) for baby:  · Alternate emergency contact: [facility] [phone], Lennox Madrid ([address] [place] [address]) [phone]  · Other children: None  · Lives with: Patient and  alone  · Support System: Friends and family  · Baby Supplies: All necessary items including car seat and safe sleep space acquired  · Bottle or Breast Feeding: Breast feeding   · Breast Pump if breast feeding: Medela pump received prior to delivery  · Government Assistance Programs/WIC/EBT/SSI: None  · : Agustin Howe will attended [address] [employer] [facility] once mom goes back to work  · Work/School: Mom works full time at [employer]  · [employer]: Both mom and dad have cars  · Pediatrician: [facility]  · Prenatal Care: [facility], no concerns  · Mental Health Hx or Treatment: Denies  · Substance Abuse: Occasional THC use prior to preganancy, none during pregnancy, no plans to resume  · Legal Issues: Denies          · Community Referrals, C&Y, VNA: Denies  · Insurance for baby: Discussed  · POA/LW: N/A     CM reviewed discharge planning process including the following: identifying caregivers at home, preference for d/c planning needs, availability of treatment team to discuss questions or concerns patient and/or family may have regarding diagnosis, plan of care, old or new medications and discharge planning   CM will continue to follow for care coordination and update assessment as appropriate      Pt denies any other CM needs at this time  Encouraged family to contact CM as needed  No other CM needs noted for d/c home when medically cleared

## 2020-03-03 ENCOUNTER — TRANSITIONAL CARE MANAGEMENT (OUTPATIENT)
Dept: FAMILY MEDICINE CLINIC | Facility: CLINIC | Age: 35
End: 2020-03-03

## 2020-03-04 ENCOUNTER — TELEPHONE (OUTPATIENT)
Dept: OBGYN CLINIC | Facility: CLINIC | Age: 35
End: 2020-03-04

## 2020-03-05 ENCOUNTER — OFFICE VISIT (OUTPATIENT)
Dept: POSTPARTUM | Facility: CLINIC | Age: 35
End: 2020-03-05
Payer: COMMERCIAL

## 2020-03-05 ENCOUNTER — TELEPHONE (OUTPATIENT)
Dept: OBGYN CLINIC | Facility: CLINIC | Age: 35
End: 2020-03-05

## 2020-03-05 VITALS
RESPIRATION RATE: 22 BRPM | SYSTOLIC BLOOD PRESSURE: 102 MMHG | DIASTOLIC BLOOD PRESSURE: 64 MMHG | HEART RATE: 82 BPM | TEMPERATURE: 98.7 F

## 2020-03-05 DIAGNOSIS — Z62.820 COUNSELING FOR PARENT-CHILD PROBLEM: Primary | ICD-10-CM

## 2020-03-05 DIAGNOSIS — Z71.89 COUNSELING FOR PARENT-CHILD PROBLEM: Primary | ICD-10-CM

## 2020-03-05 PROCEDURE — 99401 PREV MED CNSL INDIV APPRX 15: CPT | Performed by: PEDIATRICS

## 2020-03-05 NOTE — PROGRESS NOTES
INITIAL BREAST FEEDING EVALUATION    Informant/Relationship: Domenica Zazueta (Susana's mom)    Discussion of General Lactation Issues: Making sure I'm doing it right, proper way to burp, what to look for, concern over hiccups, day feedings baby sleeps after, night time feedings up longer  Infant is 11days old today   History:  Fertility Problem:no  Breast changes:yes - larger  : yes - vaginal tear  Full term:yes - 40   labor:no  First nursing/attempt < 1 hour after birth:yes - remained skin to skin an hour and a half or more  Skin to skin following delivery:yes  Breast changes after delivery:yes - engorgement present  Rooming in (infant in room with mother with exception of procedures, eg  Circumcision: no  Blood sugar issues:no  NICU stay:no  Jaundice:yes - not treated or supplemented    Phototherapy:no  Supplement given: (list supplement and method used as well as reason(s):no    Past Medical History:   Diagnosis Date    Varicella     childhood         Current Outpatient Medications:     acetaminophen (TYLENOL) 325 mg tablet, Take 2 tablets (650 mg total) by mouth every 6 (six) hours as needed for mild pain, moderate pain, severe pain or fever, Disp: 30 tablet, Rfl: 0    docusate sodium (COLACE) 100 mg capsule, Take 1 capsule (100 mg total) by mouth 2 (two) times a day, Disp: 10 capsule, Rfl: 0    ibuprofen (MOTRIN) 600 mg tablet, Take 1 tablet (600 mg total) by mouth every 6 (six) hours as needed for mild pain (cramping), Disp: 30 tablet, Rfl: 0    omeprazole (PriLOSEC) 40 MG capsule, Take 1 capsule (40 mg total) by mouth daily, Disp: 90 capsule, Rfl: 0    Prenatal MV-Min-Fe Fum-FA-DHA (PRENATAL 1 PO), Take 1 tablet by mouth daily , Disp: , Rfl:     BIOTIN PO, Take 2 tablets by mouth daily , Disp: , Rfl:     Doxylamine Succinate, Sleep, (UNISOM PO), Take 2 tablets by mouth daily at bedtime, Disp: , Rfl:     hydrocortisone 1 % cream, Apply 1 application topically as needed for irritation (Patient not taking: Reported on 3/5/2020), Disp: 30 g, Rfl: 0    Multiple Vitamins-Minerals (HAIR SKIN & NAILS ADVANCED) TABS, Take by mouth, Disp: , Rfl:     No Known Allergies    Social History     Substance and Sexual Activity   Drug Use Yes    Types: Marijuana    Comment: last used  apr 2019       Social History     Interval Breastfeeding History:    Frequency of breast feeding: every 3 hours and at night, every hour and a half or hour    Does mother feel breastfeeding is effective: Yes  Does infant appear satisfied after nursing:Yes  Stooling pattern normal: lYes  Urinating frequently:Yes  Using shield or shells: No    Alternative/Artificial Feedings:   Bottle: No  Cup: No  Syringe/Finger: No           Formula Type: n/a                     Amount:n/a             Breast Milk:                      Amount: n/a              Frequency Q every 1-3 Hr between feedings  Elimination Problems: No      Equipment:  Nipple Shield             Type: n/a               Size: n/a               Frequency of Use: n/a    Pump            Type: Susie            Frequency of Use: one time  Shells            Type: n/a              Frequency of use: n/a      Equipment Problems: no    Mom:  Breast: Hard areas/Firmness  Nipple Assessment in General: Open wound   Mother's Awareness of Feeding Cues                 Recognizes: Yes                  Verbalizes: Yes  Support System: Sharon Gil and Rosanna Hernandez  History of Breastfeeding: none  Changes/Stressors/Violence: lack of sleep  Concerns/Goals: More comfortable     Problems with Mom: positioning and cracked nipples    Physical Exam   Constitutional: She is oriented to person, place, and time  She appears well-developed and well-nourished  HENT:   Head: Normocephalic and atraumatic  Eyes: Pupils are equal, round, and reactive to light  EOM are normal    Neck: Normal range of motion  Neck supple  Cardiovascular: Normal rate, regular rhythm and normal heart sounds  Pulmonary/Chest: Effort normal and breath sounds normal        Abdominal: Soft  Bowel sounds are normal    Musculoskeletal: Normal range of motion  Neurological: She is alert and oriented to person, place, and time  Skin: Skin is warm and dry  Capillary refill takes less than 2 seconds  Psychiatric: She has a normal mood and affect  Her behavior is normal  Judgment and thought content normal        Infant:  Behaviors: Alert and Sleepy  Color: Jaundice  Birth weight: 3629 g   Current weight:    3515 g       Transferred 60 ml's breast milk at this feeding assessment! Problems with infant: slightly jaundiced      General Appearance:  Alert, active, no distress                            Head:  Normocephalic, AFOF, sutures opposed                            Eyes:   Conjunctiva clear, no drainage                            Ears:   Normally placed, no anomolies                           Nose:   Septum intact, no drainage or erythema                          Mouth:  No lesions                   Neck:  Supple, symmetrical, trachea midline, no adenopathy; thyroid: no enlargement, symmetric, no tenderness/mass/nodules                Respiratory:  No grunting, flaring, retractions, breath sounds clear and equal           Cardiovascular:  Regular rate and rhythm  No murmur  Adequate perfusion/capillary refill  Femoral pulse present                  Abdomen:    Soft, non-tender, no masses, bowel sounds present, no HSM            Genitourinary:  Normal female genitalia, anus patent                         Spine:   No abnormalities noted       Musculoskeletal:   Full range of motion         Skin/Hair/Nails:   Skin warm, dry, and intact, no rashes or abnormal dyspigmentation or lesions, umbilical stump remains attached  Neurologic:   No abnormal movement, tone appropriate for gestational age     Latch:  Efficiency:               Lips Flanged:  No              Depth of latch: deep after assisted Audible Swallow: Yes              Visible Milk: Yes              Wide Open/ Asymmetrical: Yes, after assistance                Suck Swallow Cycle: Breathing: unlabored, Coordinated: yes    Nipple Assessment after latch: Open wound   Latch Problems: shallow independently    Position:  Infant's Ergonomics/Body               Body Alignment: No               Head Supported: No               Close to Mom's body/ Lifted/ Supported: No               Mom's Ergonomics/Body: No                           Supported: No                           Sitting Back: No                           Brings Baby to her breast: No  Positioning Problems: infant sitting in in [place] lap and face turned to face breast initially, then repositioned  Lolidamien Kristys reported increased comfort with positioning suggestions  Handouts:   Storing human milk and Check List for latch-on    Education:  Reviewed Latch: deep    Reviewed Positioning for Dyad: football and cross cradle hold    Reviewed Frequency/Supply & Demand: discussed circadian rhythms and supply  Reviewed Infant:Cues and varied States of Awareness  Reviewed Infant Elimination: yes  Reviewed Alternative/Artificial Feedings: paced bottle feeding    Reviewed Mom/Breast care: breast compression  Reviewed Equipment: does not have pump with her today  Plan:  Wax paper (occlusive dressings) to heal cracked nipples  Continue to work on latching at the breast with deep latches  Come back as needed for further evaluation  I have spent 80 minutes with Patient and family today in which greater than 50% of this time was spent in counseling/coordination of care regarding Patient and family education

## 2020-03-05 NOTE — LETTER
March 5, 2020     DO [address]    Patient: Damon More   YOB: 1985   Date of Visit: 3/5/2020       Dear Dr Marni Maharaj: Thank you for referring Zack Shine to me for evaluation  Below are my notes for this consultation  If you have questions, please do not hesitate to call me  I look forward to following your patient along with you           Sincerely,        [facility] LC NURSE        CC: [facility]

## 2020-03-07 LAB — PLACENTA IN STORAGE: NORMAL

## 2020-03-10 ENCOUNTER — TELEPHONE (OUTPATIENT)
Dept: OBGYN CLINIC | Facility: CLINIC | Age: 35
End: 2020-03-10

## 2020-03-10 NOTE — TELEPHONE ENCOUNTER
Spoke with patient, aware should be taking 600mg ibuprofen q 4-6 hours as needed for pain  Denies any fever, chills or odors   Aware to call back if still with breakthrough pain with ibuprofen for apt

## 2020-03-19 ENCOUNTER — TELEPHONE (OUTPATIENT)
Dept: OBGYN CLINIC | Facility: CLINIC | Age: 35
End: 2020-03-19

## 2020-03-19 NOTE — TELEPHONE ENCOUNTER
Spoke with pt about apt on 3/23/20 that we will be canceling the apt in the office  Verified with pt how she is feeling at this time  Pt states she is currently not having any pain or issues from [facility] on 2/29/20 with Dr Tomasz Guadalupe  Pt states her bleeding is decreased - brownish with some dark red color, with mild cramping occurring during letdown with breast feeding and is currently taking 600mg ibuprofen every 8 hours  Pt does not have any issues with second degree laceration, taking ibuprofen to decrease swelling and irratation  Reviewed with pt to contact office if she has any changes or questions  Advised pt to reach out to Baby and [address] with recommendations with breast feeding and sleep schedule for baby  Verified phone number and email on file is correct

## 2020-03-25 ENCOUNTER — TELEMEDICINE (OUTPATIENT)
Dept: OBGYN CLINIC | Facility: CLINIC | Age: 35
End: 2020-03-25
Payer: COMMERCIAL

## 2020-03-25 DIAGNOSIS — Z30.019 ENCOUNTER FOR INITIAL PRESCRIPTION OF CONTRACEPTIVES, UNSPECIFIED CONTRACEPTIVE: Primary | ICD-10-CM

## 2020-03-25 PROCEDURE — 99213 OFFICE O/P EST LOW 20 MIN: CPT | Performed by: PHYSICIAN ASSISTANT

## 2020-03-25 RX ORDER — ACETAMINOPHEN AND CODEINE PHOSPHATE 120; 12 MG/5ML; MG/5ML
1 SOLUTION ORAL DAILY
Qty: 28 TABLET | Refills: 3 | Status: SHIPPED | OUTPATIENT
Start: 2020-03-25 | End: 2020-07-23

## 2020-03-25 NOTE — PROGRESS NOTES
Virtual Regular Visit    Problem List Items Addressed This Visit     None               Reason for visit is ***    Encounter provider Ruthann Head PA-C    Provider located at [address] [place] [id number]      Recent Visits  Date Type Provider Dept   03/19/20 Telephone Ez Medeiros RN [facility]   Showing recent visits within past 7 days and meeting all other requirements     Future Appointments  No visits were found meeting these conditions  Showing future appointments within next 150 days and meeting all other requirements        After connecting through [employer], the patient was identified by name and date of birth  Manuela Myers was informed that this is a telemedicine visit and that the visit is being conducted through {AMB CORONAVIRUS VISIT GOZNVX:79952} which may not be secure and therefore, might not be HIPAA-compliant  {Telemedicine confidentiality :36753} {Telemedicine participants:24461}  She acknowledged consent and understanding of privacy and security of the video platform  The patient has agreed to participate and understands they can discontinue the visit at any time  John More is a 28 y o  female ***  Past Medical History:   Diagnosis Date    Varicella     childhood       No past surgical history on file      Current Outpatient Medications   Medication Sig Dispense Refill    acetaminophen (TYLENOL) 325 mg tablet Take 2 tablets (650 mg total) by mouth every 6 (six) hours as needed for mild pain, moderate pain, severe pain or fever 30 tablet 0    BIOTIN PO Take 2 tablets by mouth daily       docusate sodium (COLACE) 100 mg capsule Take 1 capsule (100 mg total) by mouth 2 (two) times a day 10 capsule 0    Doxylamine Succinate, Sleep, (UNISOM PO) Take 2 tablets by mouth daily at bedtime      hydrocortisone 1 % cream Apply 1 application topically as needed for irritation (Patient not taking: Reported on 3/5/2020) 30 g 0    ibuprofen (MOTRIN) 600 mg tablet Take 1 tablet (600 mg total) by mouth every 6 (six) hours as needed for mild pain (cramping) 30 tablet 0    Multiple Vitamins-Minerals (HAIR SKIN & NAILS ADVANCED) TABS Take by mouth      omeprazole (PriLOSEC) 40 MG capsule Take 1 capsule (40 mg total) by mouth daily 90 capsule 0    Prenatal MV-Min-Fe Fum-FA-DHA (PRENATAL 1 PO) Take 1 tablet by mouth daily        No current facility-administered medications for this visit  No Known Allergies    Review of Systems      I spent *** minutes with the patient during this visit

## 2020-03-25 NOTE — PROGRESS NOTES
Virtual Regular Visit    Problem List Items Addressed This Visit     None      Visit Diagnoses     Encounter for initial prescription of contraceptives, unspecified contraceptive    -  Primary    Relevant Medications    norethindrone (MICRONOR) 0 35 MG tablet               Reason for visit is     Encounter provider Maricarmen Honeycutt PA-C    Provider located at [address] [place] [id number]      Recent Visits  Date Type Provider Dept   03/19/20 Telephone Adamsángela Susana, [address] [facility]   Showing recent visits within past 7 days and meeting all other requirements     Future Appointments  No visits were found meeting these conditions  Showing future appointments within next 150 days and meeting all other requirements        After connecting through [employer], the patient was identified by name and date of birth  Justine Bean was informed that this is a telemedicine visit and that the visit is being conducted through [employer] which may not be secure and therefore, might not be HIPAA-compliant  My office door was closed  No one else was in the room  She acknowledged consent and understanding of privacy and security of the video platform  The patient has agreed to participate and understands they can discontinue the visit at any time  Subjective  Justine Bean is a 28 y o  female   Past Medical History:   Diagnosis Date    Varicella     childhood       No past surgical history on file      Current Outpatient Medications   Medication Sig Dispense Refill    acetaminophen (TYLENOL) 325 mg tablet Take 2 tablets (650 mg total) by mouth every 6 (six) hours as needed for mild pain, moderate pain, severe pain or fever 30 tablet 0    BIOTIN PO Take 2 tablets by mouth daily       docusate sodium (COLACE) 100 mg capsule Take 1 capsule (100 mg total) by mouth 2 (two) times a day 10 capsule 0    ibuprofen (MOTRIN) 600 mg tablet Take 1 tablet (600 mg total) by mouth every 6 (six) hours as needed for mild pain (cramping) 30 tablet 0    Multiple Vitamins-Minerals (HAIR SKIN & NAILS ADVANCED) TABS Take by mouth      norethindrone (MICRONOR) 0 35 MG tablet Take 1 tablet (0 35 mg total) by mouth daily 28 tablet 3    omeprazole (PriLOSEC) 40 MG capsule Take 1 capsule (40 mg total) by mouth daily 90 capsule 0    Prenatal MV-Min-Fe Fum-FA-DHA (PRENATAL 1 PO) Take 1 tablet by mouth daily        No current facility-administered medications for this visit  No Known Allergies    Review of Systems   Constitutional: Negative  Respiratory: Negative  Gastrointestinal: Negative  Endocrine: Negative  Genitourinary: Negative  Physical Exam   Constitutional: She appears well-developed and well-nourished  HENT:   Head: Normocephalic and atraumatic  Psychiatric: She has a normal mood and affect  Her behavior is normal  Judgment and thought content normal       Pt s/p     Doing well  Bleeding has minimized  Was having some moderate amount of cramping which has improved w NSAIDs  No bowel/bladder complaints  Has not resumed IC yet  She will plan to abstain from IC and then plan at 6 weeks to start POP  She the will likely plan to return to combo pill after she is done nursing  R/w pt start up, usage, etc    Baby is breast feeding and doing well  I spent 15 minutes with the patient during this visit

## 2020-04-14 ENCOUNTER — TELEPHONE (OUTPATIENT)
Dept: OBGYN CLINIC | Facility: CLINIC | Age: 35
End: 2020-04-14

## 2020-04-24 ENCOUNTER — TELEMEDICINE (OUTPATIENT)
Dept: POSTPARTUM | Facility: CLINIC | Age: 35
End: 2020-04-24
Payer: COMMERCIAL

## 2020-04-24 DIAGNOSIS — R52 PAIN AGGRAVATED BY BREAST FEEDING: ICD-10-CM

## 2020-04-24 DIAGNOSIS — Z71.89 ENCOUNTER FOR BREAST FEEDING COUNSELING: Primary | ICD-10-CM

## 2020-04-24 DIAGNOSIS — O92.79 PAIN AGGRAVATED BY BREAST FEEDING: ICD-10-CM

## 2020-04-24 DIAGNOSIS — O92.70 LACTATION PROBLEM: ICD-10-CM

## 2020-04-24 PROCEDURE — 99211 OFF/OP EST MAY X REQ PHY/QHP: CPT

## 2020-07-23 DIAGNOSIS — Z30.019 ENCOUNTER FOR INITIAL PRESCRIPTION OF CONTRACEPTIVES, UNSPECIFIED CONTRACEPTIVE: ICD-10-CM

## 2020-07-23 RX ORDER — ACETAMINOPHEN AND CODEINE PHOSPHATE 120; 12 MG/5ML; MG/5ML
SOLUTION ORAL
Qty: 28 TABLET | Refills: 3 | Status: SHIPPED | OUTPATIENT
Start: 2020-07-23 | End: 2020-09-09

## 2020-09-09 ENCOUNTER — ANNUAL EXAM (OUTPATIENT)
Dept: OBGYN CLINIC | Facility: CLINIC | Age: 35
End: 2020-09-09
Payer: COMMERCIAL

## 2020-09-09 VITALS
HEIGHT: 68 IN | BODY MASS INDEX: 22.4 KG/M2 | SYSTOLIC BLOOD PRESSURE: 108 MMHG | TEMPERATURE: 96.7 F | WEIGHT: 147.8 LBS | DIASTOLIC BLOOD PRESSURE: 74 MMHG

## 2020-09-09 DIAGNOSIS — Z01.419 WELL WOMAN EXAM: Primary | ICD-10-CM

## 2020-09-09 PROCEDURE — G0145 SCR C/V CYTO,THINLAYER,RESCR: HCPCS | Performed by: PHYSICIAN ASSISTANT

## 2020-09-09 PROCEDURE — S0612 ANNUAL GYNECOLOGICAL EXAMINA: HCPCS | Performed by: PHYSICIAN ASSISTANT

## 2020-09-09 PROCEDURE — 87624 HPV HI-RISK TYP POOLED RSLT: CPT | Performed by: PHYSICIAN ASSISTANT

## 2020-09-09 RX ORDER — NORGESTIMATE AND ETHINYL ESTRADIOL 7DAYSX3 28
1 KIT ORAL DAILY
Qty: 84 TABLET | Refills: 3 | Status: SHIPPED | OUTPATIENT
Start: 2020-09-09

## 2020-09-09 NOTE — PROGRESS NOTES
Assessment/Plan   Diagnoses and all orders for this visit:    Well woman exam  -     Liquid-based pap, screening    Other orders  -     norgestimate-ethinyl estradiol (ORTHO TRI-CYCLEN,TRINESSA) 0 18/0 215/0 25 MG-35 MCG per tablet; Take 1 tablet by mouth daily        Discussion    All questions have been answered to her satisfaction  RTO for APE or sooner if needed      Subjective     HPI   Ania Porras is a 28 y o  female who presents for annual well woman exam    LMP -8/20/20 ; Periods are reg q 28 days and last a few days; No excessive bleeding; No intermenstrual bleeding or spotting; Cramps are tolerable  Pt just stopped breast feeding last week  Desires combo pill restart due to acne  Feels acne sx worse since menses started back up again  No vulvar itch/burn; No vaginal itch/burn; No abn discharge or odor; No urinary sx - burning/pain/frequency/hematuria  (+) SBEs - no breast masses, asymmetry, nipple discharge or bleeding, changes in skin of breast, or breast tenderness bilaterally  No abd/pelvic pain or HAs; No menopausal symptoms: No hot flashes/night sweats, problems with intercourse, vaginal dryness; sleeping well;   Pt is sexually active in a mutually monog/ sexual relationship; No issues with intercourse although she has been concerned about discomfort that may occur w IC  R/w pt making sure she has adequate lubrication, slow start and being gentle as she acclimates back into it; She declines std/hiv/hep testing; Feels safe at home  Current contraception: POP desires combo pills    (+) PCP for routine Bw/care; Last Pap - 12/21/16 WNL neg HPV  History of abnormal Pap smear: none     Review of Systems   Constitutional: Negative  Respiratory: Negative  Gastrointestinal: Negative  Endocrine: Negative  Genitourinary: Negative          The following portions of the patient's history were reviewed and updated as appropriate: allergies, current medications, past family history, past medical history, past social history, past surgical history and problem list          OB History        1    Para   1    Term   1            AB        Living   1       SAB        TAB        Ectopic        Multiple   0    Live Births   1                 Past Medical History:   Diagnosis Date    Varicella     childhood       History reviewed  No pertinent surgical history      Family History   Problem Relation Age of Onset    Hypertension Mother     Hypertension Father     Heart disease Maternal Grandfather         CARDIAC DISORDER CAD    Drug abuse Brother     Other Maternal Grandmother         dementia    Heart disease Paternal Grandmother         CAD pacemaker    Kidney disease Paternal Grandmother     Other Paternal Grandfather         coumadin    Drug abuse Maternal Uncle        Social History     Socioeconomic History    Marital status: /Civil Union     Spouse name: Not on file    Number of children: Not on file    Years of education: Not on file    Highest education level: Not on file   Occupational History    Not on file   Social Needs    Financial resource strain: Not on file    Food insecurity     Worry: Not on file     Inability: Not on file    Transportation needs     Medical: Not on file     Non-medical: Not on file   Tobacco Use    Smoking status: Former Smoker     Last attempt to quit: 2018     Years since quittin 5    Smokeless tobacco: Never Used   Substance and Sexual Activity    Alcohol use: Yes     Frequency: 2-4 times a month    Drug use: Not Currently     Types: Marijuana     Comment: last used  2019    Sexual activity: Not Currently     Birth control/protection: OCP   Lifestyle    Physical activity     Days per week: Not on file     Minutes per session: Not on file    Stress: Not on file   Relationships    Social connections     Talks on phone: Not on file     Gets together: Not on file     Attends Zoroastrianism service: Not on file Active member of club or organization: Not on file     Attends meetings of clubs or organizations: Not on file     Relationship status: Not on file    Intimate partner violence     Fear of current or ex partner: Not on file     Emotionally abused: Not on file     Physically abused: Not on file     Forced sexual activity: Not on file   Other Topics Concern    Not on file   Social History Narrative    Not on file         Current Outpatient Medications:     Prenatal MV-Min-Fe Fum-FA-DHA (PRENATAL 1 PO), Take 1 tablet by mouth daily , Disp: , Rfl:     No Known Allergies    Objective   Vitals:    09/09/20 0853   BP: 108/74   BP Location: Left arm   Patient Position: Sitting   Cuff Size: Standard   Temp: (!) 96 7 °F (35 9 °C)   Weight: 67 kg (147 lb 12 8 oz)   Height: 5' 8" (1 727 m)     Physical Exam  Constitutional:       Appearance: She is well-developed  HENT:      Head: Normocephalic and atraumatic  Cardiovascular:      Rate and Rhythm: Normal rate and regular rhythm  Pulmonary:      Effort: Pulmonary effort is normal       Breath sounds: Normal breath sounds  Chest:      Breasts: Breasts are symmetrical          Right: No inverted nipple, mass, nipple discharge, skin change or tenderness  Left: No inverted nipple, mass, nipple discharge, skin change or tenderness  Abdominal:      General: There is no distension  Palpations: Abdomen is soft  There is no mass  Tenderness: There is no guarding or rebound  Genitourinary:     Exam position: Supine  Labia:         Right: No rash  Left: No rash  Vagina: No signs of injury and foreign body  No vaginal discharge or erythema  Cervix: No cervical motion tenderness  Adnexa:         Right: No mass  Left: No mass  Skin:     General: Skin is warm and dry  Neurological:      Mental Status: She is alert and oriented to person, place, and time  Patient Instructions   Pap done     Change to combo OCPs with next cycle  Pt plans move back home to [place] soon

## 2020-09-10 LAB
HPV HR 12 DNA CVX QL NAA+PROBE: NEGATIVE
HPV16 DNA CVX QL NAA+PROBE: NEGATIVE
HPV18 DNA CVX QL NAA+PROBE: NEGATIVE

## 2020-09-15 LAB
LAB AP GYN PRIMARY INTERPRETATION: NORMAL
Lab: NORMAL